# Patient Record
Sex: MALE | Race: WHITE | NOT HISPANIC OR LATINO | Employment: OTHER | URBAN - METROPOLITAN AREA
[De-identification: names, ages, dates, MRNs, and addresses within clinical notes are randomized per-mention and may not be internally consistent; named-entity substitution may affect disease eponyms.]

---

## 2021-11-18 ENCOUNTER — OFFICE VISIT (OUTPATIENT)
Dept: FAMILY MEDICINE | Facility: CLINIC | Age: 36
End: 2021-11-18
Payer: MEDICARE

## 2021-11-18 VITALS
WEIGHT: 199 LBS | TEMPERATURE: 98 F | DIASTOLIC BLOOD PRESSURE: 76 MMHG | SYSTOLIC BLOOD PRESSURE: 134 MMHG | HEART RATE: 80 BPM | HEIGHT: 70 IN | BODY MASS INDEX: 28.49 KG/M2

## 2021-11-18 DIAGNOSIS — D17.21 LIPOMA OF RIGHT UPPER EXTREMITY: ICD-10-CM

## 2021-11-18 DIAGNOSIS — J30.9 ALLERGIC SINUSITIS: ICD-10-CM

## 2021-11-18 DIAGNOSIS — Z79.899 ENCOUNTER FOR LONG-TERM (CURRENT) USE OF OTHER MEDICATIONS: ICD-10-CM

## 2021-11-18 DIAGNOSIS — Z76.89 ESTABLISHING CARE WITH NEW DOCTOR, ENCOUNTER FOR: Primary | ICD-10-CM

## 2021-11-18 DIAGNOSIS — M70.831 OTHER SOFT TISSUE DISORDERS RELATED TO USE, OVERUSE AND PRESSURE, RIGHT FOREARM: ICD-10-CM

## 2021-11-18 PROCEDURE — 99204 PR OFFICE/OUTPT VISIT, NEW, LEVL IV, 45-59 MIN: ICD-10-PCS | Mod: S$GLB,,, | Performed by: PHYSICIAN ASSISTANT

## 2021-11-18 PROCEDURE — 99204 OFFICE O/P NEW MOD 45 MIN: CPT | Mod: S$GLB,,, | Performed by: PHYSICIAN ASSISTANT

## 2021-11-18 RX ORDER — LORATADINE 10 MG/1
10 TABLET ORAL DAILY
Qty: 90 TABLET | Refills: 1 | Status: SHIPPED | OUTPATIENT
Start: 2021-11-18 | End: 2022-11-18

## 2021-11-24 ENCOUNTER — TELEPHONE (OUTPATIENT)
Dept: FAMILY MEDICINE | Facility: CLINIC | Age: 36
End: 2021-11-24
Payer: MEDICARE

## 2021-11-24 LAB
ALBUMIN SERPL-MCNC: 4.5 G/DL (ref 3.6–5.1)
ALBUMIN/GLOB SERPL: 1.8 (CALC) (ref 1–2.5)
ALP SERPL-CCNC: 90 U/L (ref 36–130)
ALT SERPL-CCNC: 30 U/L (ref 9–46)
APPEARANCE UR: CLEAR
AST SERPL-CCNC: 20 U/L (ref 10–40)
BACTERIA #/AREA URNS HPF: NORMAL /HPF
BACTERIA UR CULT: NORMAL
BASOPHILS # BLD AUTO: 20 CELLS/UL (ref 0–200)
BASOPHILS NFR BLD AUTO: 0.4 %
BILIRUB SERPL-MCNC: 0.6 MG/DL (ref 0.2–1.2)
BILIRUB UR QL STRIP: NEGATIVE
BUN SERPL-MCNC: 19 MG/DL (ref 7–25)
BUN/CREAT SERPL: NORMAL (CALC) (ref 6–22)
CALCIUM SERPL-MCNC: 9.7 MG/DL (ref 8.6–10.3)
CHLORIDE SERPL-SCNC: 101 MMOL/L (ref 98–110)
CHOLEST SERPL-MCNC: 194 MG/DL
CHOLEST/HDLC SERPL: 5.4 (CALC)
CO2 SERPL-SCNC: 27 MMOL/L (ref 20–32)
COLOR UR: YELLOW
CREAT SERPL-MCNC: 1.1 MG/DL (ref 0.6–1.35)
EOSINOPHIL # BLD AUTO: 311 CELLS/UL (ref 15–500)
EOSINOPHIL NFR BLD AUTO: 6.1 %
ERYTHROCYTE [DISTWIDTH] IN BLOOD BY AUTOMATED COUNT: 14.5 % (ref 11–15)
GLOBULIN SER CALC-MCNC: 2.5 G/DL (CALC) (ref 1.9–3.7)
GLUCOSE SERPL-MCNC: 85 MG/DL (ref 65–99)
GLUCOSE UR QL STRIP: NEGATIVE
HBA1C MFR BLD: 5.5 % OF TOTAL HGB
HCT VFR BLD AUTO: 47.8 % (ref 38.5–50)
HDLC SERPL-MCNC: 36 MG/DL
HGB BLD-MCNC: 15.2 G/DL (ref 13.2–17.1)
HGB UR QL STRIP: NEGATIVE
HYALINE CASTS #/AREA URNS LPF: NORMAL /LPF
KETONES UR QL STRIP: NEGATIVE
LDLC SERPL CALC-MCNC: 122 MG/DL (CALC)
LEUKOCYTE ESTERASE UR QL STRIP: NEGATIVE
LYMPHOCYTES # BLD AUTO: 1341 CELLS/UL (ref 850–3900)
LYMPHOCYTES NFR BLD AUTO: 26.3 %
MCH RBC QN AUTO: 25.7 PG (ref 27–33)
MCHC RBC AUTO-ENTMCNC: 31.8 G/DL (ref 32–36)
MCV RBC AUTO: 80.7 FL (ref 80–100)
MONOCYTES # BLD AUTO: 342 CELLS/UL (ref 200–950)
MONOCYTES NFR BLD AUTO: 6.7 %
NEUTROPHILS # BLD AUTO: 3086 CELLS/UL (ref 1500–7800)
NEUTROPHILS NFR BLD AUTO: 60.5 %
NITRITE UR QL STRIP: NEGATIVE
NONHDLC SERPL-MCNC: 158 MG/DL (CALC)
PH UR STRIP: 5.5 [PH] (ref 5–8)
PLATELET # BLD AUTO: 361 THOUSAND/UL (ref 140–400)
PMV BLD REES-ECKER: 10.1 FL (ref 7.5–12.5)
POTASSIUM SERPL-SCNC: 4.6 MMOL/L (ref 3.5–5.3)
PROT SERPL-MCNC: 7 G/DL (ref 6.1–8.1)
PROT UR QL STRIP: NEGATIVE
RBC # BLD AUTO: 5.92 MILLION/UL (ref 4.2–5.8)
RBC #/AREA URNS HPF: NORMAL /HPF
SODIUM SERPL-SCNC: 136 MMOL/L (ref 135–146)
SP GR UR STRIP: 1.02 (ref 1–1.03)
SQUAMOUS #/AREA URNS HPF: NORMAL /HPF
TRIGL SERPL-MCNC: 238 MG/DL
TSH SERPL-ACNC: 3.98 MIU/L (ref 0.4–4.5)
WBC # BLD AUTO: 5.1 THOUSAND/UL (ref 3.8–10.8)
WBC #/AREA URNS HPF: NORMAL /HPF

## 2021-12-14 ENCOUNTER — HOSPITAL ENCOUNTER (OUTPATIENT)
Dept: RADIOLOGY | Facility: HOSPITAL | Age: 36
Discharge: HOME | End: 2021-12-14
Attending: PHYSICIAN ASSISTANT
Payer: MEDICARE

## 2021-12-14 DIAGNOSIS — M70.831 OTHER SOFT TISSUE DISORDERS RELATED TO USE, OVERUSE AND PRESSURE, RIGHT FOREARM: ICD-10-CM

## 2021-12-14 DIAGNOSIS — D17.21 LIPOMA OF RIGHT UPPER EXTREMITY: ICD-10-CM

## 2021-12-14 PROCEDURE — 76882 US LMTD JT/FCL EVL NVASC XTR: CPT | Mod: TC,PO,RT

## 2021-12-20 ENCOUNTER — TELEPHONE (OUTPATIENT)
Dept: FAMILY MEDICINE | Facility: CLINIC | Age: 36
End: 2021-12-20
Payer: MEDICARE

## 2021-12-22 ENCOUNTER — TELEPHONE (OUTPATIENT)
Dept: FAMILY MEDICINE | Facility: CLINIC | Age: 36
End: 2021-12-22
Payer: MEDICARE

## 2021-12-22 DIAGNOSIS — R22.31 LOCALIZED SWELLING, MASS, OR LUMP OF RIGHT UPPER EXTREMITY: ICD-10-CM

## 2022-01-03 ENCOUNTER — TELEPHONE (OUTPATIENT)
Dept: FAMILY MEDICINE | Facility: CLINIC | Age: 37
End: 2022-01-03
Payer: MEDICARE

## 2022-01-03 NOTE — TELEPHONE ENCOUNTER
----- Message from Radha Cuellar sent at 1/3/2022 11:53 AM CST -----  Patient father (Fidencio)called and stated that his son is having a MRI on Wednesday and he would like to speak to the nurse about what kind of tracer will be used for the test please give him a call at [phone]

## 2022-01-03 NOTE — TELEPHONE ENCOUNTER
----- Message from Meka Veras sent at 1/3/2022  2:58 PM CST -----  Pt's dad calling to ask if there will be contrast with the MRI that has been ordered and scheduled said the pt is very allergic to Iodine causing Anaphylaxis.  He wants to know what will be used and will there be any iodine agents used.  # [phone]

## 2022-01-05 ENCOUNTER — HOSPITAL ENCOUNTER (OUTPATIENT)
Dept: RADIOLOGY | Facility: HOSPITAL | Age: 37
Discharge: HOME | End: 2022-01-05
Attending: PHYSICIAN ASSISTANT
Payer: MEDICARE

## 2022-01-05 DIAGNOSIS — R22.31 LOCALIZED SWELLING, MASS, OR LUMP OF RIGHT UPPER EXTREMITY: ICD-10-CM

## 2022-01-05 PROCEDURE — 73220 MRI UPPR EXTREMITY W/O&W/DYE: CPT | Mod: TC,RT

## 2022-01-05 PROCEDURE — 25500020 PHARM REV CODE 255: Performed by: PHYSICIAN ASSISTANT

## 2022-01-05 PROCEDURE — A9585 GADOBUTROL INJECTION: HCPCS | Performed by: PHYSICIAN ASSISTANT

## 2022-01-05 RX ORDER — GADOBUTROL 604.72 MG/ML
9 INJECTION INTRAVENOUS
Status: COMPLETED | OUTPATIENT
Start: 2022-01-05 | End: 2022-01-05

## 2022-01-05 RX ADMIN — GADOBUTROL 9 ML: 604.72 INJECTION INTRAVENOUS at 04:01

## 2022-01-12 ENCOUNTER — TELEPHONE (OUTPATIENT)
Dept: FAMILY MEDICINE | Facility: CLINIC | Age: 37
End: 2022-01-12
Payer: MEDICARE

## 2022-01-12 NOTE — TELEPHONE ENCOUNTER
----- Message from KASHIF Christianson sent at 1/10/2022  8:40 AM CST -----  Please contact patient and inform him that his MRI of the right forearm was reviewed.  A benign, lipoma (fatty tissue mass) was noted.  This lesion is benign and we will continue to monitor of follow-up appointments.

## 2024-08-26 ENCOUNTER — HOSPITAL ENCOUNTER (OUTPATIENT)
Facility: HOSPITAL | Age: 39
LOS: 1 days | Discharge: PSYCHIATRIC OR SUBSTANCE USE FACILITY | End: 2024-08-27
Attending: EMERGENCY MEDICINE | Admitting: HOSPITALIST
Payer: MEDICARE

## 2024-08-26 DIAGNOSIS — F29 PSYCHOSIS: ICD-10-CM

## 2024-08-26 DIAGNOSIS — R41.82 ALTERED MENTAL STATUS, UNSPECIFIED ALTERED MENTAL STATUS TYPE: Primary | ICD-10-CM

## 2024-08-26 DIAGNOSIS — R07.9 CHEST PAIN: ICD-10-CM

## 2024-08-26 DIAGNOSIS — R41.0 CONFUSION: ICD-10-CM

## 2024-08-26 DIAGNOSIS — Z00.8 MEDICAL CLEARANCE FOR PSYCHIATRIC ADMISSION: ICD-10-CM

## 2024-08-26 LAB
ALBUMIN SERPL BCP-MCNC: 4.8 G/DL (ref 3.5–5.2)
ALP SERPL-CCNC: 48 U/L (ref 55–135)
ALT SERPL W/O P-5'-P-CCNC: 19 U/L (ref 10–44)
AMMONIA PLAS-SCNC: 26 UMOL/L (ref 10–50)
AMPHET+METHAMPHET UR QL: NEGATIVE
ANION GAP SERPL CALC-SCNC: 12 MMOL/L (ref 8–16)
APAP SERPL-MCNC: 0.3 UG/ML (ref 10–20)
AST SERPL-CCNC: 19 U/L (ref 10–40)
BARBITURATES UR QL SCN>200 NG/ML: NEGATIVE
BASOPHILS # BLD AUTO: 0.01 K/UL (ref 0–0.2)
BASOPHILS NFR BLD: 0.2 % (ref 0–1.9)
BENZODIAZ UR QL SCN>200 NG/ML: NEGATIVE
BILIRUB SERPL-MCNC: 0.5 MG/DL (ref 0.1–1)
BILIRUB UR QL STRIP: NEGATIVE
BUN SERPL-MCNC: 27 MG/DL (ref 6–20)
BZE UR QL SCN: NEGATIVE
CALCIUM SERPL-MCNC: 10 MG/DL (ref 8.7–10.5)
CANNABINOIDS UR QL SCN: ABNORMAL
CHLORIDE SERPL-SCNC: 101 MMOL/L (ref 95–110)
CLARITY UR: CLEAR
CO2 SERPL-SCNC: 25 MMOL/L (ref 23–29)
COLOR UR: YELLOW
CREAT SERPL-MCNC: 1.1 MG/DL (ref 0.5–1.4)
CREAT UR-MCNC: 130.5 MG/DL (ref 23–375)
DIFFERENTIAL METHOD BLD: NORMAL
EOSINOPHIL # BLD AUTO: 0.2 K/UL (ref 0–0.5)
EOSINOPHIL NFR BLD: 3.7 % (ref 0–8)
ERYTHROCYTE [DISTWIDTH] IN BLOOD BY AUTOMATED COUNT: 13 % (ref 11.5–14.5)
EST. GFR  (NO RACE VARIABLE): >60 ML/MIN/1.73 M^2
ETHANOL SERPL-MCNC: <10 MG/DL
GLUCOSE SERPL-MCNC: 103 MG/DL (ref 70–110)
GLUCOSE UR QL STRIP: NEGATIVE
HCT VFR BLD AUTO: 42.5 % (ref 40–54)
HGB BLD-MCNC: 14.1 G/DL (ref 14–18)
HGB UR QL STRIP: NEGATIVE
IMM GRANULOCYTES # BLD AUTO: 0.01 K/UL (ref 0–0.04)
IMM GRANULOCYTES NFR BLD AUTO: 0.2 % (ref 0–0.5)
KETONES UR QL STRIP: ABNORMAL
LEUKOCYTE ESTERASE UR QL STRIP: NEGATIVE
LYMPHOCYTES # BLD AUTO: 1.1 K/UL (ref 1–4.8)
LYMPHOCYTES NFR BLD: 26 % (ref 18–48)
MCH RBC QN AUTO: 27.2 PG (ref 27–31)
MCHC RBC AUTO-ENTMCNC: 33.2 G/DL (ref 32–36)
MCV RBC AUTO: 82 FL (ref 82–98)
MONOCYTES # BLD AUTO: 0.4 K/UL (ref 0.3–1)
MONOCYTES NFR BLD: 9.1 % (ref 4–15)
NEUTROPHILS # BLD AUTO: 2.6 K/UL (ref 1.8–7.7)
NEUTROPHILS NFR BLD: 60.8 % (ref 38–73)
NITRITE UR QL STRIP: NEGATIVE
NRBC BLD-RTO: 0 /100 WBC
OPIATES UR QL SCN: NEGATIVE
PCP UR QL SCN>25 NG/ML: NEGATIVE
PH UR STRIP: 6 [PH] (ref 5–8)
PLATELET # BLD AUTO: 289 K/UL (ref 150–450)
PMV BLD AUTO: 9.9 FL (ref 9.2–12.9)
POTASSIUM SERPL-SCNC: 3.7 MMOL/L (ref 3.5–5.1)
PROT SERPL-MCNC: 7.5 G/DL (ref 6–8.4)
PROT UR QL STRIP: NEGATIVE
RBC # BLD AUTO: 5.18 M/UL (ref 4.6–6.2)
SALICYLATES SERPL-MCNC: <1.5 MG/DL (ref 15–30)
SARS-COV-2 RDRP RESP QL NAA+PROBE: NEGATIVE
SODIUM SERPL-SCNC: 138 MMOL/L (ref 136–145)
SP GR UR STRIP: 1.03 (ref 1–1.03)
T4 FREE SERPL-MCNC: 0.84 NG/DL (ref 0.71–1.51)
TOXICOLOGY INFORMATION: ABNORMAL
TSH SERPL DL<=0.005 MIU/L-ACNC: 7.23 UIU/ML (ref 0.34–5.6)
URN SPEC COLLECT METH UR: ABNORMAL
UROBILINOGEN UR STRIP-ACNC: NEGATIVE EU/DL
WBC # BLD AUTO: 4.27 K/UL (ref 3.9–12.7)

## 2024-08-26 PROCEDURE — G0426 INPT/ED TELECONSULT50: HCPCS | Mod: 95,,, | Performed by: PSYCHIATRY & NEUROLOGY

## 2024-08-26 PROCEDURE — 80053 COMPREHEN METABOLIC PANEL: CPT | Performed by: EMERGENCY MEDICINE

## 2024-08-26 PROCEDURE — 84439 ASSAY OF FREE THYROXINE: CPT | Performed by: EMERGENCY MEDICINE

## 2024-08-26 PROCEDURE — 99285 EMERGENCY DEPT VISIT HI MDM: CPT | Mod: 25

## 2024-08-26 PROCEDURE — 25000003 PHARM REV CODE 250: Performed by: HOSPITALIST

## 2024-08-26 PROCEDURE — 25000003 PHARM REV CODE 250: Performed by: EMERGENCY MEDICINE

## 2024-08-26 PROCEDURE — U0002 COVID-19 LAB TEST NON-CDC: HCPCS | Performed by: EMERGENCY MEDICINE

## 2024-08-26 PROCEDURE — 82077 ASSAY SPEC XCP UR&BREATH IA: CPT | Performed by: EMERGENCY MEDICINE

## 2024-08-26 PROCEDURE — 11000001 HC ACUTE MED/SURG PRIVATE ROOM

## 2024-08-26 PROCEDURE — 81003 URINALYSIS AUTO W/O SCOPE: CPT | Mod: 59 | Performed by: EMERGENCY MEDICINE

## 2024-08-26 PROCEDURE — 80307 DRUG TEST PRSMV CHEM ANLYZR: CPT | Performed by: EMERGENCY MEDICINE

## 2024-08-26 PROCEDURE — 82140 ASSAY OF AMMONIA: CPT | Performed by: EMERGENCY MEDICINE

## 2024-08-26 PROCEDURE — 80143 DRUG ASSAY ACETAMINOPHEN: CPT | Performed by: EMERGENCY MEDICINE

## 2024-08-26 PROCEDURE — 85025 COMPLETE CBC W/AUTO DIFF WBC: CPT | Performed by: EMERGENCY MEDICINE

## 2024-08-26 PROCEDURE — 84443 ASSAY THYROID STIM HORMONE: CPT | Performed by: EMERGENCY MEDICINE

## 2024-08-26 PROCEDURE — 80179 DRUG ASSAY SALICYLATE: CPT | Performed by: EMERGENCY MEDICINE

## 2024-08-26 RX ORDER — GLUCAGON 1 MG
1 KIT INJECTION
Status: DISCONTINUED | OUTPATIENT
Start: 2024-08-26 | End: 2024-08-27 | Stop reason: HOSPADM

## 2024-08-26 RX ORDER — TALC
6 POWDER (GRAM) TOPICAL NIGHTLY PRN
Status: CANCELLED | OUTPATIENT
Start: 2024-08-26

## 2024-08-26 RX ORDER — SODIUM,POTASSIUM PHOSPHATES 280-250MG
2 POWDER IN PACKET (EA) ORAL
Status: DISCONTINUED | OUTPATIENT
Start: 2024-08-26 | End: 2024-08-27 | Stop reason: HOSPADM

## 2024-08-26 RX ORDER — ALUMINUM HYDROXIDE, MAGNESIUM HYDROXIDE, AND SIMETHICONE 1200; 120; 1200 MG/30ML; MG/30ML; MG/30ML
30 SUSPENSION ORAL 4 TIMES DAILY PRN
Status: DISCONTINUED | OUTPATIENT
Start: 2024-08-26 | End: 2024-08-27 | Stop reason: HOSPADM

## 2024-08-26 RX ORDER — IBUPROFEN 200 MG
24 TABLET ORAL
Status: DISCONTINUED | OUTPATIENT
Start: 2024-08-26 | End: 2024-08-27 | Stop reason: HOSPADM

## 2024-08-26 RX ORDER — LANOLIN ALCOHOL/MO/W.PET/CERES
800 CREAM (GRAM) TOPICAL
Status: DISCONTINUED | OUTPATIENT
Start: 2024-08-26 | End: 2024-08-27 | Stop reason: HOSPADM

## 2024-08-26 RX ORDER — DIPHENHYDRAMINE HCL 25 MG
50 CAPSULE ORAL
Status: COMPLETED | OUTPATIENT
Start: 2024-08-26 | End: 2024-08-26

## 2024-08-26 RX ORDER — ACETAMINOPHEN 325 MG/1
650 TABLET ORAL EVERY 4 HOURS PRN
Status: DISCONTINUED | OUTPATIENT
Start: 2024-08-26 | End: 2024-08-27 | Stop reason: HOSPADM

## 2024-08-26 RX ORDER — SODIUM CHLORIDE 0.9 % (FLUSH) 0.9 %
10 SYRINGE (ML) INJECTION EVERY 12 HOURS PRN
Status: DISCONTINUED | OUTPATIENT
Start: 2024-08-26 | End: 2024-08-27 | Stop reason: HOSPADM

## 2024-08-26 RX ORDER — AMOXICILLIN 250 MG
1 CAPSULE ORAL
Status: CANCELLED | OUTPATIENT
Start: 2024-08-26

## 2024-08-26 RX ORDER — IBUPROFEN 200 MG
16 TABLET ORAL
Status: DISCONTINUED | OUTPATIENT
Start: 2024-08-26 | End: 2024-08-27 | Stop reason: HOSPADM

## 2024-08-26 RX ORDER — ONDANSETRON HYDROCHLORIDE 2 MG/ML
4 INJECTION, SOLUTION INTRAVENOUS EVERY 6 HOURS PRN
Status: CANCELLED | OUTPATIENT
Start: 2024-08-26

## 2024-08-26 RX ORDER — NALOXONE HCL 0.4 MG/ML
0.02 VIAL (ML) INJECTION
Status: DISCONTINUED | OUTPATIENT
Start: 2024-08-26 | End: 2024-08-27 | Stop reason: HOSPADM

## 2024-08-26 RX ORDER — TRAZODONE HYDROCHLORIDE 50 MG/1
50 TABLET ORAL
Status: COMPLETED | OUTPATIENT
Start: 2024-08-26 | End: 2024-08-26

## 2024-08-26 RX ORDER — TRAZODONE HYDROCHLORIDE 50 MG/1
100 TABLET ORAL NIGHTLY
Status: DISCONTINUED | OUTPATIENT
Start: 2024-08-26 | End: 2024-08-27 | Stop reason: HOSPADM

## 2024-08-26 RX ORDER — HYDROCODONE BITARTRATE AND ACETAMINOPHEN 5; 325 MG/1; MG/1
1 TABLET ORAL EVERY 6 HOURS PRN
Status: DISCONTINUED | OUTPATIENT
Start: 2024-08-26 | End: 2024-08-27 | Stop reason: HOSPADM

## 2024-08-26 RX ORDER — ACETAMINOPHEN 325 MG/1
650 TABLET ORAL EVERY 8 HOURS PRN
Status: DISCONTINUED | OUTPATIENT
Start: 2024-08-26 | End: 2024-08-27 | Stop reason: HOSPADM

## 2024-08-26 RX ADMIN — TRAZODONE HYDROCHLORIDE 100 MG: 50 TABLET ORAL at 11:08

## 2024-08-26 RX ADMIN — DIPHENHYDRAMINE HYDROCHLORIDE 50 MG: 25 CAPSULE ORAL at 10:08

## 2024-08-26 RX ADMIN — TRAZODONE HYDROCHLORIDE 50 MG: 50 TABLET ORAL at 11:08

## 2024-08-26 NOTE — Clinical Note
Diagnosis: Altered mental status, unspecified altered mental status type [[id number]]   Future Attending Provider: SUSAN BYNUM [[id number]]   Place in Observation: [facility] [[id number]]

## 2024-08-27 VITALS
SYSTOLIC BLOOD PRESSURE: 138 MMHG | HEART RATE: 73 BPM | WEIGHT: 150 LBS | HEIGHT: 70 IN | TEMPERATURE: 98 F | RESPIRATION RATE: 20 BRPM | DIASTOLIC BLOOD PRESSURE: 86 MMHG | OXYGEN SATURATION: 97 % | BODY MASS INDEX: 21.47 KG/M2

## 2024-08-27 PROBLEM — F24 SHARED PSYCHOTIC DISORDER: Status: ACTIVE | Noted: 2024-08-27

## 2024-08-27 LAB
FOLATE SERPL-MCNC: 9.3 NG/ML (ref 4–24)
HIV1+2 IGG SERPL QL IA.RAPID: NEGATIVE
OHS QRS DURATION: 138 MS
OHS QTC CALCULATION: 443 MS
VIT B12 SERPL-MCNC: 384 PG/ML (ref 210–950)

## 2024-08-27 PROCEDURE — 82607 VITAMIN B-12: CPT | Performed by: HOSPITALIST

## 2024-08-27 PROCEDURE — 86703 HIV-1/HIV-2 1 RESULT ANTBDY: CPT | Performed by: HOSPITALIST

## 2024-08-27 PROCEDURE — G0378 HOSPITAL OBSERVATION PER HR: HCPCS

## 2024-08-27 PROCEDURE — 86592 SYPHILIS TEST NON-TREP QUAL: CPT | Performed by: HOSPITALIST

## 2024-08-27 PROCEDURE — 82746 ASSAY OF FOLIC ACID SERUM: CPT | Performed by: HOSPITALIST

## 2024-08-27 NOTE — ED NOTES
Pt laying comfortably in bed with Sister at bedside. Bed in lowest and locked position with one rail up due to pt preferring to sit on side of bed at times. Vitals machine at bedside due to complaint of consumption of THC gummies. There are no other moveable objects in the room and environment remains safe. Sitter remains in full view of the pt at all times. Pt and sister denies any needs at this time.    conducted a detailed discussion... I had a detailed discussion with the patient and/or guardian regarding the historical points, exam findings, and any diagnostic results supporting the discharge/admit diagnosis.

## 2024-08-27 NOTE — ED NOTES
Pt is seated on the end of the bed watching tv and is obviously restless. Bed in lowest and locked position with bed rails up x2. Pt denies any needs at this time. The is only a vital machine at bedside and no other moveable objects, environment remains safe. Sitter in full view of pt at all times.

## 2024-08-27 NOTE — ED NOTES
Pt seated on the side of the bed in no obvious distress with a chest rise and fall observed. Sister at bedside. Bed in lowest and locked position with one rail up due to pt preferring to sit on side of bed. Vitals machine at bedside due to complaint of consumption of THC gummies. There are no other moveable objects in the room and environment remains safe. Sitter remains in full view of the pt at all times. Pt and sister denies any needs at this time.

## 2024-08-27 NOTE — CONSULTS
"[facility]  Department of Neurology  Neurology Consultation Note        PATIENT NAME: Fidencio Torres  MRN: [id number]  CSN: [id number]      TODAY'S DATE: 08/27/2024  ADMIT DATE: 8/26/2024                            CONSULTING PROVIDER: Maykel Yanez MD  CONSULT REQUESTED BY: Yaa Jenkins MD      Reason for consult:  Altered mental status      History obtained from chart review and father at bedside.    HPI: Fidencio Torres is a 39 y.o. male with a history of testicular cancer in remission, mild cognitive impairment since birth, p/w acute behavioral changes with onset at around 1 p.m. on 8/26/24. Patient's father provides the history. He reports that patient had otherwise been in his usual state of health until he began unusually perseverating.  Patient apparently took marijuana gummies on Friday and has slept through Saturday and Sunday.  He woke up on Monday morning at about 6 p.m. and was in his normal health until 1 p.m. when he started acting abnormal and repeating himself.  He was perseverating and the foci of his perseverations shifted from topic to topic, but he remained abnormally focused and unable to be redirected or regain a semblance of what a typical day would be. His father described this as being "confused".     His father reports that he spends a considerable amount of time alone, playing video games, or out of state with a female partner. Patient had recently returned to [place] to have "his teeth cleaned", as he has particularly poor dentition/self-care. The patient went to a gas station w/ his father and purchased Delta 9 gummies (although, per the , patient attempted to purchase something that was allegedly "illegal"), and his father reports that patient typically cuts these in half and takes 1/2 gummy per night as a sleep aid. Patient had been taking them in that manner, until 8/23/24 when he presumably took all of the final 4 gummies in the package. Per father, the " "remaining 4 disappeared. Patient does not deny that he consumed all of them. His father does deny that he had any other ingestants, and his last "vaping" was weeks PTA.     Patient reportedly had some kind of behavioral hospitalization at age 7, has what his family identifies as "oppositional defiant disorder", and then had testicular cancer treated approx 20 years.    On my assessment, he was awake and oriented to self/person and place and knew this was August 2024(with some clues).  He was able to follow commands appropriately.  Intermittently was perseverating and asking what time it was to his father.  His father states that this is much better than what he was yesterday however is not completely back to his baseline.      Psychiatry has evaluated him in the ER and recommended inpatient psych admission for possible acute psychosis in the setting of use of THC gummies.    Patient denies any headache, vision trouble, nausea, vomiting, unilateral weakness or sensory changes.    PREVIOUS MEDICAL HISTORY:  Past Medical History:   Diagnosis Date    Cancer 2009    Testicular      PREVIOUS SURGICAL HISTORY:  Past Surgical History:   Procedure Laterality Date    testical removal Right 2009     FAMILY MEDICAL HISTORY:  Family History   Problem Relation Name Age of Onset    No Known Problems Mother      No Known Problems Father      No Known Problems Sister       ALLERGIES:  Review of patient's allergies indicates:   Allergen Reactions    Iodine and iodide containing products Anaphylaxis     HOME MEDICATIONS:  Prior to Admission medications    Not on File     CURRENT SCHEDULED MEDICATIONS:   trazodone  100 mg Oral QHS     CURRENT INFUSIONS:    CURRENT PRN MEDICATIONS:    Current Facility-Administered Medications:     acetaminophen, 650 mg, Oral, Q8H PRN    acetaminophen, 650 mg, Oral, Q4H PRN    aluminum-magnesium hydroxide-simethicone, 30 mL, Oral, QID PRN    dextrose 50%, 12.5 g, Intravenous, PRN    dextrose 50%, 25 g, " "Intravenous, PRN    glucagon (human recombinant), 1 mg, Intramuscular, PRN    glucose, 16 g, Oral, PRN    glucose, 24 g, Oral, PRN    HYDROcodone-acetaminophen, 1 tablet, Oral, Q6H PRN    magnesium oxide, 800 mg, Oral, PRN    magnesium oxide, 800 mg, Oral, PRN    naloxone, 0.02 mg, Intravenous, PRN    potassium bicarbonate, 35 mEq, Oral, PRN    potassium bicarbonate, 50 mEq, Oral, PRN    potassium bicarbonate, 60 mEq, Oral, PRN    potassium, sodium phosphates, 2 packet, Oral, PRN    potassium, sodium phosphates, 2 packet, Oral, PRN    potassium, sodium phosphates, 2 packet, Oral, PRN    sodium chloride 0.9%, 10 mL, Intravenous, Q12H PRN    REVIEW OF SYSTEMS:  Please refer to the HPI for all pertinent positive and negative findings. A comprehensive review of all other systems was negative.    PHYSICAL EXAM:  Patient Vitals for the past 24 hrs:   BP Temp Temp src Pulse Resp SpO2 Height Weight   08/26/24 1827 122/64 98.2 °F (36.8 °C) Oral 70 16 96 % 5' 10" (1.778 m) 68 kg (150 lb)       GENERAL APPEARANCE: Alert, well-developed, well-nourished male in mild distress.  HEENT: Normocephalic and atraumatic. PERRL. Oropharynx unremarkable.  PULM: Normal respiratory effort. No accessory muscle use.  CV: RRR.  ABDOMEN: Soft, nontender.  EXTREMITIES: No obvious signs of vascular compromise. Pulses present. No cyanosis, clubbing or edema.  SKIN: Clear; no rashes, lesions or skin breaks in exposed areas.    NEURO:  MENTAL STATUS:  Patient is awake, oriented to self/person and place.  He was able to tell me the month and year with some clues  CRANIAL NERVES:  Pupils equal round and reactive to light, extraocular movements are intact, face is grossly symmetric.    MOTOR:  Bulk normal. Tone normal and symmetric throughout.  Abnormal movements absent.  Tremor: none present.  Strength 5/5 throughout.    REFLEXES:  DTRs 2+ throughout.  Plantar response equivocal bilaterally.  SENSATION: grossly intact throughout.  COORDINATION: normal " "finger-to-nose.  STATION: not tested.  GAIT: not tested.    Labs:  Recent Labs   Lab 08/26/24 1946      K 3.7      CO2 25   BUN 27*   CREATININE 1.1      CALCIUM 10.0     Recent Labs   Lab 08/26/24 1946   WBC 4.27   HGB 14.1   HCT 42.5        Recent Labs   Lab 08/26/24 1946   ALBUMIN 4.8   PROT 7.5   BILITOT 0.5   ALKPHOS 48*   ALT 19   AST 19     No results found for: "PT", "PTT", "INR"  Lab Results   Component Value Date    TRIG 238 (H) 11/23/2021    HDL 36 (L) 11/23/2021    CHOLHDL 5.4 (H) 11/23/2021     Lab Results   Component Value Date    HGBA1C 5.5 11/23/2021     No results found for: "PROTEINCSF", "GLUCCSF", "WBCCSF"    Imaging:  I have reviewed and interpreted the pertinent imaging and lab results.      X-Ray Chest AP Portable  Narrative: EXAMINATION:  XR CHEST AP PORTABLE    CLINICAL HISTORY:  Disorientation, unspecified    TECHNIQUE:  Single frontal view of the chest was performed.    COMPARISON:  None    FINDINGS:  Cardiac silhouette appears within normal limits.  Lungs are symmetrically expanded without evidence of confluent airspace consolidation.  No significant volume of pleural fluid or pneumothorax identified.  There is a mild rightward curvature of the thoracic spine.  Osseous structures appear intact.  Scattered surgical clips project over the upper abdomen.  Impression: No radiographic evidence of acute intrathoracic process on this single view.    Electronically signed by: Caden Burton MD  Date:    08/26/2024  Time:    19:55  CT Head Without Contrast  Narrative: EXAMINATION:  CT HEAD WITHOUT CONTRAST    CLINICAL HISTORY:  Mental status change, unknown cause;    TECHNIQUE:  Low dose axial images were obtained through the head.  Coronal and sagittal reformations were also performed. Contrast was not administered.    COMPARISON:  None.    FINDINGS:  There is no acute intracranial hemorrhage, hydrocephalus, midline shift or mass effect. Gray-white matter " differentiation appears maintained. The basal cisterns are patent. There is mild mucosal thickening of the frontal and ethmoid sinuses.  The mastoid air cells appear well aerated.  No acute displaced calvarial fracture identified.  Impression: No CT evidence of acute intracranial abnormality. Clinical correlation and further evaluation as warranted.    Electronically signed by: Caden Burton MD  Date:    08/26/2024  Time:    19:41         ASSESSMENT & PLAN:      Encephalopathy   Acute psychosis      Plan:   Etiology of encephalopathy/acute psychosis could likely be in the setting of THC gummies.  Exam was nonfocal and patient was able to follow commands appropriately and answer to most questions.  Concern for acute intracranial pathology is low  however can not completely rule out at this time.  CT head was negative for acute pathology.  If mental status continues to improve, can hold off on further testing.  If no significant improvement in mental status is seen, will need to consider MRI brain with and without contrast and lumbar puncture to rule out autoimmune encephalopathy.    Psychiatry following.  Recommended inpatient psych admission.    Medical management for acute psychosis per Psychiatry.    Avoid benzodiazepines and anticholinergics.    Please call with any questions        Thank you kindly for including us in the care of this patient. Please do not hesitate to contact us with any questions.           Maykel Yanez MD  Neurology/vascular Neurology  Date of Service: 08/27/2024  9:52 AM    --------------------------------------------------------------------------------------------------------------------------------------------------------------------------------------------------------------------------------------------------------------  Please note: This note was transcribed using voice recognition software. Because of this technology there are often uinintended grammatical, spelling, and other  transcription errors. Please disregard these errors.

## 2024-08-27 NOTE — ED NOTES
SPD in unit for transport. Security & pt's dad present. Contact information for [facility] given to dad. Pt given paper scrubs for transport. Pt is awake, answers questions appropriately. Skin w/dr/pk. Rr even/unlab. Calm, cooperative.

## 2024-08-27 NOTE — PLAN OF CARE
PATTERSON explained to patient and father - pt's father v/u and signed form. PATTERSON uploaded into media manager.    08/27/24 1204   PATTERSON Message   Medicare Outpatient and Observation Notification regarding financial responsibility Given to patient/caregiver;Explained to patient/caregiver;Signed/date by patient/caregiver   Date PATTERSON was signed 08/27/24   Time PATTERSON was signed [id number]

## 2024-08-27 NOTE — ED PROVIDER NOTES
Encounter Date: 8/26/2024       History     Chief Complaint   Patient presents with    Mental Health Problem     Pt brought in by family for eval d/t pt being altered. He took unknown amount of delta 9 gummies. he also stated to family he was suicidal PTA, but denies now.     39-year-old male with a history of testicular cancer, oppositional defiant disorder, ADHD presents to the emergency room for abrupt onset of perseveration and confusion that started around noon today.  Patient took 4 delta gummies last night but none today.  He vapes but does not smoke or do any drugs or drink alcohol.  Family states that he was perseverating today for hours repeating that he wanted to go to the spot.  This stopped and then he began to perseverate using his sister's phone.  She would give it to him then he would ask for back and so the pattern would repeat.  This is very abnormal for the patient.  They state typically he is able to do activities of basic living but he does have a developmental delay and possibly is on the autism spectrum according to the sister who is a psychiatric nurse.  Patient denies any complaints.    Sister also reports patient has made multiple suicidal statements lately and has been depressed.    The history is provided by the patient and a relative. The history is limited by the condition of the patient.     Review of patient's allergies indicates:   Allergen Reactions    Iodine and iodide containing products Anaphylaxis     Past Medical History:   Diagnosis Date    Cancer 2009    Testicular      Past Surgical History:   Procedure Laterality Date    testical removal Right 2009     Family History   Problem Relation Name Age of Onset    No Known Problems Mother      No Known Problems Father      No Known Problems Sister       Social History     Tobacco Use    Smoking status: Never    Smokeless tobacco: Never   Substance Use Topics    Alcohol use: Not Currently     Comment: 1 beer on holidays    Drug use:  Not Currently     Types: Marijuana     Review of Systems   Unable to perform ROS: Mental status change       Physical Exam     Initial Vitals [08/26/24 1827]   BP Pulse Resp Temp SpO2   122/64 70 16 98.2 °F (36.8 °C) 96 %      MAP       --         Physical Exam    Nursing note and vitals reviewed.  Constitutional: He appears well-developed and well-nourished. He is not diaphoretic.  Non-toxic appearance. He does not have a sickly appearance. He does not appear ill. No distress.   HENT:   Head: Normocephalic and atraumatic.   Eyes: EOM are normal.   Neck: Neck supple.   Normal range of motion.  Cardiovascular:  Normal rate, regular rhythm and normal heart sounds.     Exam reveals no gallop and no friction rub.       No murmur heard.  Pulmonary/Chest: Breath sounds normal. No respiratory distress. He has no wheezes. He has no rhonchi. He has no rales.   Abdominal: Abdomen is soft. He exhibits no distension. There is no abdominal tenderness. There is no rebound.   Musculoskeletal:         General: Normal range of motion.      Cervical back: Normal range of motion and neck supple. No rigidity. Normal range of motion.     Neurological: He is alert.   Thinks that the year is 1983, able to follow basic commands.  Oriented to the state but not the exact location.  According to the family this is abnormal for him not to be oriented to the year and location.   Skin: Skin is warm and dry. No rash noted.   Psychiatric: Cognition and memory are impaired. He exhibits abnormal recent memory and abnormal remote memory. He is inattentive.         ED Course   Procedures  Labs Reviewed   COMPREHENSIVE METABOLIC PANEL - Abnormal       Result Value    Sodium 138      Potassium 3.7      Chloride 101      CO2 25      Glucose 103      BUN 27 (*)     Creatinine 1.1      Calcium 10.0      Total Protein 7.5      Albumin 4.8      Total Bilirubin 0.5      Alkaline Phosphatase 48 (*)     AST 19      ALT 19      eGFR >60.0      Anion Gap 12      TSH - Abnormal    TSH 7.226 (*)    URINALYSIS, REFLEX TO URINE CULTURE - Abnormal    Specimen UA Urine, Clean Catch      Color, UA Yellow      Appearance, UA Clear      pH, UA 6.0      Specific Gravity, UA 1.030      Protein, UA Negative      Glucose, UA Negative      Ketones, UA Trace (*)     Bilirubin (UA) Negative      Occult Blood UA Negative      Nitrite, UA Negative      Urobilinogen, UA Negative      Leukocytes, UA Negative      Narrative:     Specimen Source->Urine   DRUG SCREEN PANEL, URINE EMERGENCY - Abnormal    Benzodiazepines Negative      Cocaine (Metab.) Negative      Opiate Scrn, Ur Negative      Barbiturate Screen, Ur Negative      Amphetamine Screen, Ur Negative      THC Presumptive Positive (*)     Phencyclidine Negative      Creatinine, Urine 130.5      Toxicology Information SEE COMMENT      Narrative:     Specimen Source->Urine   ACETAMINOPHEN LEVEL - Abnormal    Acetaminophen (Tylenol), Serum 0.3 (*)    SALICYLATE LEVEL - Abnormal    Salicylate Lvl <1.5 (*)    CBC W/ AUTO DIFFERENTIAL    WBC 4.27      RBC 5.18      Hemoglobin 14.1      Hematocrit 42.5      MCV 82      MCH 27.2      MCHC 33.2      RDW 13.0      Platelets 289      MPV 9.9      Immature Granulocytes 0.2      Gran # (ANC) 2.6      Immature Grans (Abs) 0.01      Lymph # 1.1      Mono # 0.4      Eos # 0.2      Baso # 0.01      nRBC 0      Gran % 60.8      Lymph % 26.0      Mono % 9.1      Eosinophil % 3.7      Basophil % 0.2      Differential Method Automated     ALCOHOL,MEDICAL (ETHANOL)    Alcohol, Serum <10     SARS-COV-2 RNA AMPLIFICATION, QUAL    SARS-CoV-2 RNA, Amplification, Qual Negative     AMMONIA    Ammonia 26     T4, FREE   POCT GLUCOSE MONITORING CONTINUOUS          Imaging Results              X-Ray Chest AP Portable (Final result)  Result time 08/26/24 19:55:51      Final result by Caden Burton MD (08/26/24 19:55:51)                   Impression:      No radiographic evidence of acute intrathoracic process on  this single view.      Electronically signed by: Caden Burton MD  Date:    08/26/2024  Time:    19:55               Narrative:    EXAMINATION:  XR CHEST AP PORTABLE    CLINICAL HISTORY:  Disorientation, unspecified    TECHNIQUE:  Single frontal view of the chest was performed.    COMPARISON:  None    FINDINGS:  Cardiac silhouette appears within normal limits.  Lungs are symmetrically expanded without evidence of confluent airspace consolidation.  No significant volume of pleural fluid or pneumothorax identified.  There is a mild rightward curvature of the thoracic spine.  Osseous structures appear intact.  Scattered surgical clips project over the upper abdomen.                                       CT Head Without Contrast (Final result)  Result time 08/26/24 19:41:05      Final result by Caden Burton MD (08/26/24 19:41:05)                   Impression:      No CT evidence of acute intracranial abnormality. Clinical correlation and further evaluation as warranted.      Electronically signed by: Caden Burton MD  Date:    08/26/2024  Time:    19:41               Narrative:    EXAMINATION:  CT HEAD WITHOUT CONTRAST    CLINICAL HISTORY:  Mental status change, unknown cause;    TECHNIQUE:  Low dose axial images were obtained through the head.  Coronal and sagittal reformations were also performed. Contrast was not administered.    COMPARISON:  None.    FINDINGS:  There is no acute intracranial hemorrhage, hydrocephalus, midline shift or mass effect. Gray-white matter differentiation appears maintained. The basal cisterns are patent. There is mild mucosal thickening of the frontal and ethmoid sinuses.  The mastoid air cells appear well aerated.  No acute displaced calvarial fracture identified.                                       Medications - No data to display  Medical Decision Making  Amount and/or Complexity of Data Reviewed  Labs: ordered. Decision-making details documented in ED Course.  Radiology:  ordered. Decision-making details documented in ED Course.  Discussion of management or test interpretation with external provider(s): Spoke with Tele psych MD - who feels pt has a substance abuse induced psychosis.  But he was not stable for discharge home recommends patient was going to psychiatric facility if symptoms do not improve I discussed with them that [facility] I will pass this information on to hospitalist.   Darlene Borrego M.D.  11:17 PM 8/26/2024      Risk  OTC drugs.  Decision regarding hospitalization.               ED Course as of 08/26/24 2130   Mon Aug 26, 2024   1853 SpO2: 96 % [EF]   1853 Resp: 16 [EF]   1853 Pulse: 70 [EF]   1853 Temp Source: Oral [EF]   1853 BP: 122/64 [EF]   2003 CT Head Without Contrast [EF]   2003 X-Ray Chest AP Portable [EF]   2007 Sinus rhythm with sinus arrhythmia 69 beats per minute normal axis no ST elevation or depression nonspecific intraventricular block independently interpreted [EF]   2016 WBC: 4.27 [EF]   2016 Hemoglobin: 14.1 [EF]   2016 Platelet Count: 289 [EF]   2016 Leukocyte Esterase, UA: Negative [EF]   2016 NITRITE UA: Negative [EF]   2059 Patient care transferred to Dr Borrego at shift change. I discussed the relevant history, physical exam, laboratory findings, radiological findings and anticipated disposition plan. On coming staff to formally complete visit disposition, review any pending laboratory/radiological results and to addend treatment plan as necessary.     [EF]   2059 Normal CBC and CMP  Ammonia level 26  Urine with trace ketones otherwise normal  Tox negative  Tylenol less than 0.3  CMP with BUN 27  COVID negative [RM]   2100 I took over care of this pt at the end of Dr Haines shift  [RM]   2123 TSH 7.226  Alcohol level less than 10  Salicylates less than 1.5 [RM]      ED Course User Index  [EF] Jamari Haines MD  [RM] Darlene Borrego MD          All labs have returned other than  BUN being elevated at 27 and patient can tolerate oral intake and TSH being elevated with a T4 currently pending no other labs explain the patient's altered mental status.  Pec has been completed by previous physician.  Patient was now be admitted to the hospital for further evaluation of altered mental status I will call and let his sister Lizeth know who he was just left the hospital her phone #[phone] work [phone]  Darlene Borrego M.D.  9:28 PM 8/26/2024                   Clinical Impression:  Final diagnoses:  [Z00.8] Medical clearance for psychiatric admission  [R41.0] Confusion  [R41.82] Altered mental status, unspecified altered mental status type (Primary)          ED Disposition Condition    Admit Stable                Darlene Borrego MD  08/26/24 0212       Darlene Borrego MD  08/26/24 8731

## 2024-08-27 NOTE — SUBJECTIVE & OBJECTIVE
Past Medical History:   Diagnosis Date    Cancer 2009    Testicular        Past Surgical History:   Procedure Laterality Date    testical removal Right 2009       Review of patient's allergies indicates:   Allergen Reactions    Iodine and iodide containing products Anaphylaxis       No current facility-administered medications on file prior to encounter.     No current outpatient medications on file prior to encounter.     Family History       Problem Relation (Age of Onset)    No Known Problems Mother, Father, Sister          Tobacco Use    Smoking status: Never    Smokeless tobacco: Never   Substance and Sexual Activity    Alcohol use: Not Currently     Comment: 1 beer on holidays    Drug use: Not Currently     Types: Marijuana    Sexual activity: Yes     Partners: Female     Birth control/protection: None     Review of Systems   Constitutional:  Positive for activity change. Negative for appetite change, chills and fever.   Psychiatric/Behavioral:  Positive for behavioral problems, confusion, sleep disturbance and suicidal ideas. The patient is hyperactive.      Objective:     Vital Signs (Most Recent):  Temp: 98.2 °F (36.8 °C) (08/26/24 1827)  Pulse: 70 (08/26/24 1827)  Resp: 16 (08/26/24 1827)  BP: 122/64 (08/26/24 1827)  SpO2: 96 % (08/26/24 1827) Vital Signs (24h Range):  Temp:  [98.2 °F (36.8 °C)] 98.2 °F (36.8 °C)  Pulse:  [70] 70  Resp:  [16] 16  SpO2:  [96 %] 96 %  BP: (122)/(64) 122/64     Weight: 68 kg (150 lb)  Body mass index is 21.52 kg/m².     Physical Exam  Vitals and nursing note reviewed.   Constitutional:       General: He is not in acute distress.     Appearance: He is ill-appearing.      Comments: Disheveled, unshaven   Eyes:      Extraocular Movements: Extraocular movements intact.      Pupils: Pupils are equal, round, and reactive to light.   Cardiovascular:      Rate and Rhythm: Normal rate and regular rhythm.      Heart sounds: No murmur heard.  Pulmonary:      Effort: Pulmonary effort is  normal. No respiratory distress.      Breath sounds: Normal breath sounds. No wheezing or rhonchi.   Musculoskeletal:      Cervical back: Normal range of motion and neck supple. No rigidity.   Neurological:      Mental Status: He is alert.      Cranial Nerves: No cranial nerve deficit (intact and tested).      Comments: AAOx1, but patient was unable to remain on topic long enough to answer questions about location or name (he kept repeating his birthdate when asked for the month, year, name, or location, although he eventually was able to state the correct month/year)   Psychiatric:      Comments: Clearly having psychosis w/ perseverating behavior, unable to redirect fully; no shouting or agitation; poor eye contact; not responding to internal stimuli              CRANIAL NERVES     CN III, IV, VI   Pupils are equal, round, and reactive to light.       Significant Labs: All pertinent labs within the past 24 hours have been reviewed.  BMP:   Recent Labs   Lab 08/26/24 1946         K 3.7      CO2 25   BUN 27*   CREATININE 1.1   CALCIUM 10.0     CBC:   Recent Labs   Lab 08/26/24 1946   WBC 4.27   HGB 14.1   HCT 42.5          Significant Imaging: I have reviewed all pertinent imaging results/findings within the past 24 hours.  CXR: I have reviewed all pertinent results/findings within the past 24 hours and my personal findings are:  clear

## 2024-08-27 NOTE — HPI
"39M p/w acute behavioral changes with onset at 12:48p on 8/26/24. Patient's father provides the hx. He reports that patient had otherwise been in his usual state of health until he began unusually perseverating. The foci of his perseverations shifted from topic to topic, but he remained abnormally focused and unable to be redirected or regain a semblance of what a typical day would be. His father described this as being "confused", although the patient demonstrated such perseveration on minute subjects/tasks/objects that it was difficult to engage him when I came into evaluate him. He was not agitated, or anxious appearing, but rather hypervigilant and perseverating.     His father reports that he spends a considerable amount of time alone, playing video games, or out of state with a female partner. Patient had recently returned to [place] to have "his teeth cleaned", as he has particularly poor dentition/self-care. The patient went to a [employer] station w/ his father and purchased Delta 9 gummies (although, per the , patient attempted to purchase something that was allegedly "illegal"), and his father reports that patient typically cuts these in half and takes 1/2 gummy per night as a sleep aid. Patient had been taking them in that manner, until 8/23/24 when he presumably took all of the final 4 gummies in the package. Per father, the remaining 4 disappeared. Patient does not deny that he consumed all of them. His father does deny that he had any other ingestants, and his last "vaping" was weeks PTA.    Patient reportedly had some kind of behavioral hospitalization at age 7, has what his family identifies as "oppositional defiant disorder", and then had testicular cancer treated approx 20 years.  "

## 2024-08-27 NOTE — ED NOTES
Pt sitting at the end of the bed with father at bedside. Pt is beginning  to become restless. Pt gets out of bed to pace from door to door then he sits back in bed. Pt denied any needs at this time. Pt bed in lowest and locked position with rails up x2. Environment remains safe and the only moveable object in the room is the vital machine which is deemed medically necessary  to the have in the room. Sitter in full view of the pt at all times.

## 2024-08-27 NOTE — PATIENT INSTRUCTIONS
Thank you for allowing me to participate as part of your health care team, and thank you for choosing [facility].    KIRSTEN OLIVERA MD  Board Certified in Psychiatry & Addiction Medicine      IN CASE OF SUICIDAL THINKING, call the National Suicide Hotline Number: 988    988 Suicide & Crisis Lifeline: 988 , 9-411-541-TALK (8255)  https://[employer].Identified           AFTER VISIT INSTRUCTIONS:     [x] Take all medication, from all providers, as prescribed.  [x] If questions or concerns arise, or if experiencing side effects, adverse reactions or worsening symptoms, contact your provider through the [facility] portal at [email or web address], or call [phone] to reach the [facility] main line.  [x] In cases of emergencies, call 221 or 204, or present directly to the emergency department for immediate assistance.      INFORMATION ON MENTAL HEALTH MEDICATIONS:     National [place] of Mental Health:   https://www.nimh.nih.gov/health/topics/mental-health-medications     Web MD:   https://www.[facility].MyGoodPoints       RESOURCES:     IN CASE OF SUICIDAL THINKING, call the [employer] Suicide Hotline Number: 988    988 Suicide & Crisis Lifeline: 988 , 7-076-277-TALK (8255)  Provides 24/7, free and confidential support for people in distress, prevention and crisis resources for you or your loved ones, and best practices for professionals.    Call, text or chat.  https://[employer].org     National Action Leadore for Suicide Prevention: the National Action Leadore for Suicide Prevention (Action Leadore) is the nations public-private partnership for suicide prevention, working with more than 250 national partners.   https://theactionalliance.org     National Strategy for Suicide Prevention & Risk Mitigation:  https://theactionalliance.org/our-strategy/national-strategy-suicide-prevention     [x] Fact Sheet:   https://www.hhs.gov/sites/default/files/national-strategy-for-suicide-prevention-factsheet.pdf     [x] Report:    https://www.ncbi.nlm.nih.gov/books/BBS977000/pdf/Bookshelf_NBK109917.pdf     Suicide Prevention Resource Center: The Suicide Prevention Resource Center (SPR) is the only federally supported resource center devoted to advancing the implementation of the National Strategy for Suicide Prevention. [facility] is funded by the U.S. Department of Health and Human Services' Substance Abuse and Mental Health Services Administration (SAMA).  https://www.[facility].org     [x] Safety Plan:   https://[employer]/wp-content/uploads/2021/08/Migue-Safety-Plan-8-6-21.pdf     [x] Suicide Risk Curve:  https://[employer]/wp-content/uploads/2021/08/Xdikzci-hmoe-qgued-8-6-21.pdf     [place] Mental Health Advocacy Service: the state agency tasked with protecting the legal rights of people with behavioral health diagnoses.  https://mhas.[place].[facility]     Alcoholics Anonymous (AA): find a meeting near you.  https://www.aa.org     SMI Adviser: resources for individuals and families with serious mental illness.  https://smiadviser.org     National [place] for the Mentally Ill (ELLIOT): the nation's largest grassroots organization dedicated to building better lives for individuals with mental illness.  https://www.elliot.org/Home     U.S. Department of Health and Human Services (HHS): the mission of HHS is to enhance the health and well-being of all Americans, by providing for effective health and human services and by fostering sound, sustained advances in the sciences underlying medicine, public health, and .   https://www.hhs.gov     Substance Abuse and Mental Health Services Administration (SAMHSA): SAMHSA is the agency within [facility] that leads public health efforts to advance the behavioral health of the nation. SAMHSA's mission is to reduce the impact of substance abuse and mental illness on [place] communities.   https://www.samhsa.gov     National Institutes of Health (NIH): a part of [facility], [facility] is  the largest biomedical research agency in the world.   https://www.nih.gov     National [place] on Drug Abuse (SHARLENE): sponsored by the NIH, the mission of SHARLENE is to advance science on drug use and addiction and to apply that knowledge to improve individual and public health.  https://sharlene.nih.gov     National [place] on Alcohol Abuse and Alcoholism (NIAAA): sponsored by the NIH, the mission of NIAA is to generate and disseminate fundamental knowledge about the effects of alcohol on health and well-being, and apply that knowledge to improve diagnosis, prevention, and treatment of alcohol-related problems, including alcohol use disorder, across the lifespan.   https://www.niaaa.nih.gov     National Harm Reduction Coalition: resources for harm reduction, including techniques, strategies, policy, and advocacy.  https://harmreduction.org     The SHARE Approach - A Model for Shared Decision Making:  [x] Fact Sheet  https://www.[facility].gov/sites/default/files/publications/files/share-approach_factsheet.pdf     AMA Principles of Medical Ethics - Informed Consent & Shared Decision Making:  [x] Chapter  https://www.ama-assn.org/system/files/2019-06/code-of-medical-nrzgtb-hgavhoz-5.pdf     Safety Netting for Primary Care:  [x] Article  https://www.ncbi.nlm.nih.gov/pmc/articles/RZH6048655/pdf/pqtqvov-3393--e70.pdf       MEDICATION MANAGEMENT:     [x] In addition to the potential beneficial effects, the use of any medication or drug (prescribed, over the counter or otherwise) carries with it the risk of potential adverse effects.  Each has a set of typical adverse effects - some common, some rare - but idiosyncratic and unanticipated reactions unique to you are always possible.      [x] It is important to remember that untreated illness can also pose a risk, which must be taken into account when weighing the pros and cons of a medication trial.    [x] Medications and drugs can sometimes interact with each other in the  body, leading to adverse effects - it is important that all your providers know all the medications and drugs you take - prescribed, over the counter, or otherwise.  Keep all your practitioners up to date with any changes.  It's always a good idea to keep an up-to-date list in an easily accessible location.    [x] There is an inherent unpredictability to all treatment, including the use of medication.  Unexpected outcomes can occur - keep me up to date with any difficulties you encounter.    [x] It is important to take medication as directed, and to comply fully with the instructions.  Check with the appropriate provider first before adjusting or stopping your medication on your own.    If you require further information pertaining to the issues outlined above, please reach out to your providers through the [facility] portal at [email or web address], or call [phone] to discuss.  See resource list for additional material.     Additional information can be provided pertaining to your diagnosis, intended outcomes, target symptoms for treatment, and possible benefits and risks of medication - you can also access this information through the provided resources.  Possible alternatives to the current treatment plan (including no treatment) can also be reviewed.      GENERAL HEALTH & WELLNESS:     [x] Establish and follow regularly with a primary care physician for routine health maintenance and management of any medical comorbidities.  [x] Follow a healthy diet, exercise routinely, and monitor weight and metabolic parameters.  [x] Allow adequate time for sleep and practice good sleep hygiene.  [x] Do not operate a motor vehicle or heavy machinery if the effects of medications or the symptoms underlying your condition impair the ability for you to do so safely.    Dietary Guidelines for Americans, 5513-7609:  U.S. Department of Agriculture  (USDA)  https://www.dietaryguidelines.gov/sites/default/files/2020-12/Dietary_Guidelines_for_Americans_2020-2025.pdf#page=31     The Nutrition Source:  [facility] of [facility] Health  https://www.[facility] [place].[facility]/nutritionsource       SLEEP HYGIENE:     Follow these tips to establish healthy sleep habits:  [x] Keep a consistent sleep schedule. Get up at the same time every day, even on weekends or during vacations.  [x] Set a bedtime that is early enough for you to get at least 7-8 hours of sleep.  [x] Don't go to bed unless you are sleepy.  [x] If you don't fall asleep after 20 minutes, get out of bed. Go do a quiet activity without a lot of light exposure. It is especially important to not get on electronics.  [x] Establish a relaxing bedtime routine.  [x] Use your bed only for sleep and sex.  [x] Make your bedroom quiet and relaxing. Keep the room at a comfortable, cool temperature.  [x] Limit exposure to bright light in the evenings.  [x] Turn off electronic devices at least 30 minutes before bedtime.  [x] Don't eat a large meal before bedtime. If you are hungry at night, eat a light, healthy snack.  [x] Exercise regularly and maintain a healthy diet.  [x] Avoid consuming caffeine in the afternoon or evening.  [x] Avoid consuming alcohol before bedtime.  [x] Reduce your fluid intake before bedtime.    QUICK TIPS FOR BETTER SLEEP  Reduce smartphone usage Create and maintain a nightly ritual Avoid caffeine 4-6 hours before sleeping Don't eat or drink too much at bedtime Sleep at the same time every night        American Academy of Sleep Medicine - Healthy Sleep Habits:  https://sleepeducation.org/healthy-sleep/healthy-sleep-habits     American Academy of Sleep Medicine - Bedtime Calculator:  https://sleepeducation.org/healthy-sleep/bedtime-calculator     American Academy of Sleep Medicine - Cognitive Behavioral Therapy for Insomnia (CBT-I):  https://sleepeducation.org/patients/cognitive-behavioral-therapy      American Academy of Sleep Medicine - Insomnia:  https://sleepeducation.org/sleep-disorders/insomnia       ALCOHOL & DRUG USE COUNSELING:     Preventing Excessive Alcohol Use (CDC):  https://www.cdc.gov/alcohol/fact-sheets/moderate-drinking.htm#:~:text=To%20reduce%20the%20risk%20of,days%20when%20alcohol%20is%20consumed.     [x] Alcohol consumption is associated with a variety of short- and long-term health risks, including motor vehicle crashes, violence, sexual risk behaviors, high blood pressure, and various cancers (e.g., breast cancer).  [x] The risk of these harms increases with the amount of alcohol you drink. For some conditions, like some cancers, the risk increases even at very low levels of alcohol consumption (less than 1 drink).  [x] To reduce the risk of alcohol-related harms, the 3766-3668 Dietary Guidelines for Americans recommends that adults of legal drinking age can choose not to drink, or to drink in moderation by limiting intake to 2 drinks or less in a day for men or 1 drink or less in a day for women, on days when alcohol is consumed.  [x] The Guidelines also do not recommend that individuals who do not drink alcohol start drinking for any reason and that if adults of legal drinking age choose to drink alcoholic beverages, drinking less is better for health than drinking more.  [x] The Guidelines note that some people should not drink alcohol at all, such as:  - If they are pregnant or might be pregnant.  - If they are younger than age 21.  - If they have certain medical conditions or are taking certain medications that can interact with alcohol.  - If they are recovering from an alcohol use disorder or if they are unable to control the amount they drink.  [x] The Guidelines also note that not drinking alcohol is the safest option for women who are lactating. Generally, moderate consumption of alcoholic beverages by a woman who is lactating (up to 1 standard drink in a day) is not known to  "be harmful to the infant, especially if the woman waits at least 2 hours after a single drink before nursing or expressing breast milk. Women considering consuming alcohol during lactation should talk to their healthcare provider.  [x] The Guidelines note, Emerging evidence suggests that even drinking within the recommended limits may increase the overall risk of death from various causes, such as from several types of cancer and some forms of cardiovascular disease. Alcohol has been found to increase risk for cancer, and for some types of cancer, the risk increases even at low levels of alcohol consumption (less than 1 drink in a day).  [x] Although past studies have indicated that moderate alcohol consumption has protective health benefits (e.g., reducing risk of heart disease), recent studies show this may not be true.  [x] Its important to focus on the amount people drink on the days that they drink. Even if women consume an average of 1 drink per day or men consume an average of 2 drinks per day, binge drinking increases the risk of experiencing alcohol-related harm in the short-term and in the future.    Drinking Levels Defined (NIAAA):  https://www.niaaa.nih.gov/alcohol-health/overview-alcohol-consumption/moderate-binge-drinking     Drinking in Moderation:  According to the "Dietary Guidelines for Americans 3960-1108, U.S. Department of Health and Human Services and U.S. Department of Agriculture, adults of legal drinking age can choose not to drink or to drink in moderation by limiting intake to 2 drinks or less in a day for men and 1 drink or less in a day for women, when alcohol is consumed. Drinking less is better for health than drinking more.    Binge Drinking:  NIAAA defines binge drinking as a pattern of drinking alcohol that brings blood alcohol concentration (BUTCH) to 0.08 percent - or 0.08 grams of alcohol per deciliter - or higher.  For a typical adult, this pattern corresponds to consuming 5 " or more drinks (male), or 4 or more drinks (female), in about 2 hours.    The Substance Abuse and Mental Health Services Administration (SAMHSA), which conducts the annual National Survey on Drug Use and Health (NSDUH), defines binge drinking as 5 or more alcoholic drinks for males or 4 or more alcoholic drinks for females on the same occasion (i.e., at the same time or within a couple of hours of each other) on at least 1 day in the past month.    Heavy Alcohol Use:  NIAAA defines heavy drinking as follows:  - For men, consuming more than 4 drinks on any day or more than 14 drinks per week.  - For women, consuming more than 3 drinks on any day or more than 7 drinks per week.     [facility] defines heavy alcohol use as binge drinking on 5 or more days in the past month.    Patterns of Drinking Associated with Alcohol Use Disorder:  Binge drinking and heavy alcohol use can increase an individual's risk of alcohol use disorder.    Certain people should avoid alcohol completely, including those who:  - Plan to drive or operate machinery, or participate in activities that require skill, coordination, and alertness.  - Take certain over-the-counter or prescription medications.  - Have certain medical conditions.  - Are recovering from alcohol use disorder or are unable to control the amount that they drink.  - Are younger than age 21.  - Are pregnant or may become pregnant.    U.S. Standard Drink  12 oz beer   (5% ABV) 8 oz malt liquor   (7% ABV) 5 oz wine   (12% ABV) 1.5 oz 80-proof distilled spirit  (40% ABV)        Heroin use harm reduction:  1. Carry naloxone. When using heroin, make sure you have at least one dose of naloxone - the overdose reversal drug - and have it in plain view. Understand how to give it.  2. Try a small dose first. It is best to first try a small amount of the heroin to check the effect.  3. Dont use heroin alone. Always use heroin with someone else and take turns while using.    It is possible to  overdose with heroin whether you are snorting, injecting or using it in another form.    Signs of an overdose or emergency:   - The person is awake but unable to talk.  - Their body is limp.  - Their breathing is shallow or slow or stopped.  - Their skin is pale, ashen or clammy/sweaty.  - They are unconscious.    In case of emergency, give naloxone. If you suspect the heroin may contain fentanyl, administer more than one dose. Seek medical help even if naloxone has been given. Call 911 for help.      ADHD TREATMENT AND STIMULANT MEDICATIONS:     [facility] Prescription Stimulants Drug Facts  CMS Stimulant and Related Medications: Use in Adults  JUS Drug Fact Sheets: Stimulants  FDA Drug Safety Communication: Stimulants  [facility] ADHD  WebMD ADHD Medications and Side Effects  [facility]: ADHD Medication      SHARED DECISION MAKING & INFORMED CONSENT:     Shared medical decision making and informed consent are the hallmark and bedrock of excellent clinical care.  During the encounter, shared medical decision making was employed and informed consent was obtained, to the degree possible, whenever feasible, appropriate and relevant. Those interventions are supplemented here with written materials, detailing the topics in more depth.       PSYCHOEDUCATION:     Psychoeducation pertaining to the following -     Diagnosis Etiology Disease Processes Natural Progression   Treatment Options Time Course Safety Netting Informed Consent   Intended Benefits of Medication Expectable Adverse Effects Target Symptoms for Treatment Alternatives to Current Treatment   Shared   Decision Making Risk Mitigation Strategies Harm Reduction Techniques Associated Bio-Med Complications     - can be further discussed and reviewed (you can also access additional information through the provided resources in this document).      Effective communication is essential in order to engage in shared medical decision making.  If you had difficulty understanding  anything during your encounter or in this supplementary document, please contact your providers through the [facility] portal at [email or web address] or call [phone].     Jose Armando Dictionary  https://dictionary.jose armando.org/us       It can be easy to miss, forget, or misremember important important information that was discussed during the session - especially when you're stressed, upset, or don't feel well.  If you or a representative have any additional questions, concerns, or topics to discuss - please contact your providers through the [facility] portal at [email or web address] or call [phone].    Memory Loss  https://www.[employer]/brain/memory-loss    Causes of Memory Loss  https://www.[employer]/what-causes-memory-loss-2699738    Memory loss: When to seek help  https://www.[facility].org/diseases-conditions/alzheimers-disease/in-depth/memory-loss/art-09567243    Memory, Forgetfulness, and Aging: What's Normal and What's Not?  https://www.emily.nih.gov/health/memory-forgetfulness-and-aging-whats-normal-and-whats-not    Depression and Memory Loss  https://www.[employer]/health/depression/depression-and-memory-loss    The Relationship Between Anxiety and Memory Loss  https://www.[facility]/academics/blog-posts/the-relationship-between-anxiety-and-memory-loss     PRESCRIPTION DRUG MANAGEMENT:     Prescription Drug Management entails the following:  [x] The review, recommendation, or consideration without recommendation of medications during the encounter.  [x] Discussion (to the extent possible) with the patient and/or other interested parties of the diagnosis, target symptoms, intended outcomes, and possible benefits and risks of medication, as well as alternatives (including no treatment), if not otherwise known or stated prior.  [x] Discussion (to the extent possible) with the patient and/or other interested parties of possible expectable adverse effects of any proposed individual  psychotropic agents, as well as the inherent unpredictability of treatment, if not otherwise known or stated prior.  [x] Informed consent is sought from the patient (and/or guardian/designated decision maker, if applicable) after a thorough discussion (to the extent possible) of the aforementioned points outlined above.  [x] The provision of counseling (to the extent possible) to the patient and/or other interested parties on the importance of full compliance with any prescribed medication, if not otherwise known or stated prior.    Information on psychotropic medication can be found at:   [place] of Mental Health: Information on Mental Health Medications      RISK MITIGATION, HARM REDUCTION & SAFETY NETTING:     Risk Mitigation Strategies, Harm Reduction Techniques, and Safety Netting are important interventions that can reduce acute and chronic risk.  As such, opportunities were sought to incorporate psychoeducation and practical advice pertaining to these topics into the encounter, to the degree possible, whenever feasible, appropriate and relevant.  Those interventions are supplemented here with written materials, detailing the topics in more depth.       RISK MITIGATION STRATEGIES:     Risk mitigation strategies are used to reduce the likelihood of future episodes of suicide, homicide, violence, and/or other problematic behaviors (e.g. self-injurious, risky, addictive, compulsive, impulsive). The following are examples of risk mitigation strategies which you can employ in order to reduce your overall burden of risk.     [x] Treatment of underlying psychopathology driving acute and chronic risk to the extent possible.  [x] Use of self administered rating scales and journaling to assist in risk tracking.  [x] Exploration of protective factors to potentially counterbalance risk.  [x] Identification and avoidance of triggers and situations that increase risk, including excessive alcohol and drug  use.  [x] Timely follow up and ongoing treatment of mental health issues moving forward.  [x] Full compliance with medication regimen.  [x] A good working knowledge of your medication regimen, including specific instructions on the administration of the medications.  [x] Consultation with an appropriate medical provider prior to altering or deviating from these instructions on your own.  [x] Active involvement and participation of family and natural support wherever feasible and possible.  [x] Development and review of coping strategies that can be immediately deployed in times of acute crisis.  [x] Implementation of home safety practices and the removal/reduction of access to lethal means (including, but not limited to, firearms, certain types and quantities of medication, poisons, or other methods you may have contemplated or identified).  [x] Collaborative development of a written safety plan with your treatment team and loved ones that can be immediately referred to in times of acute crisis.  [x] Utilization of a safety contract to engage your treatment team and further assess/manage risk.  [x] A good working knowledge of how to access emergency treatment in times of acute crisis.  [x] Utilization of suicide hotlines number (988) and resources in times of crisis.    If you require further information pertaining to the issues outlined above, please reach out to your providers through the [facility] portal at [email or web address], or call [phone] to discuss.  See resource list for additional material.      SAFETY NETTING:     In healthcare, safety netting refers to the provision of information to help patients or carers identify the need to consult a health care professional if a health concern arises or changes.  The relevance of this advice is most obvious with chronic mental illnesses, as their dynamic nature, with symptoms and signs emerging at different times and in different combinations, makes safety  netting particularly important.  Specific safety net advice for you includes the following:    [x] The existence of uncertainty. Mental health diagnoses and conditions contain at least some degree of uncertainty - knowing this, you should feel empowered to reconsult if necessary.  [x] What exactly to look out for. Given the recognised risk of possible deterioration or the development of complications, you should become familiar with the specific clinical features (including red flags) to look out for.    [x] How exactly to seek further help. You should know how and where to seek further help if needed.  Make a plan in advance and keep it handy.  It's also a good idea to share the plan with your treatment providers and loved ones.  [x] What to expect about time course. Mental health diagnoses and conditions often have an expected time course, which is important information for you to know.  However, if your difficulties do not conform to this time line and concerns arise, do not delay seeking further medical advice.    If you require further information pertaining to the issues outlined above, please reach out to your providers through the [facility] portal at [email or web address], or call [phone] to discuss.  See resource list for additional material.      HARM REDUCTION:     Harm Reduction techniques are used in an effort to reduce negative consequences associated with risky and maladaptive behaviors, until cessation of the problematic behaviors can be established.  Harm reduction is best thought of as a journey and not a destination; it is not an endorsement of problematic behavior, but an acknowledgement and recognition of the step-by-step nature of recovery.      Although commonly employed in working with people who suffer with drug addiction, harm reduction can be more broadly applied to any problematic behavior.    Harm Reduction and Substance Abuse:  [x] Incorporates a spectrum of strategies that  includes safer use, managed use, abstinence, meeting people who use drugs where theyre at, and addressing conditions of use along with the use itself.  [x] Accepts, for better or worse, that licit and illicit drug use is part of our world and chooses to work to minimize its harmful effects rather than simply ignore or condemn them.  [x] Understands drug use as a complex, multi-faceted phenomenon that encompasses a continuum of behaviors from severe use to total abstinence, and acknowledges that some ways of using drugs are clearly safer than others.  [x] Calls for the non-judgmental, non-coercive provision of services and resources to people who use drugs and the communities in which they live in order to assist them in reducing attendant harm.  [x] Affirms people who use drugs themselves as the primary agents of reducing the harms of their drug use and seeks to empower them to share information and support each other in strategies which meet their actual conditions of use.  [x] Does not attempt to minimize or ignore the real and tragic harm and danger that can be associated with illicit drug use.  [x] Meets people where they are, but seeks to not leave them there.  [x] Examples of specific interventions include, but are not limited to, narcan (naloxone), medication assisted treatment, syringe access, overdose prevention, and safer drug use techniques.    Key Harm Reduction Strategies: Opioid Use Disorder  [x] Safe Injection Sites & Equipment  [x] Managed Use  [x] Syringe Exchange Programs  [x] Fentanyl Test Strips  [x] Pharmacotherapy/Medication Assisted Treatment  [x] Narcan  [x] Good Buddhism Laws  [x] Treatment Instead of long-term  [x] Diversion Programs  [x] Overdose Education  [x] Abstinence    Whether or not you struggle with substance abuse, any and all opportunities to employ harm reduction techniques to address difficult to change problematic behaviors should be sought and implemented - whenever and  "wherever feasible, relevant and applicable. Additionally, harm reduction techniques can be applied broadly, and are relevant for a multitude of situations - even those that do not involve problematic or maladaptive behaviors.     EXAMPLES OF HARM REDUCTION IN OTHER AREAS  SUN SCREEN SEAT BELTS SPEED LIMITS BIRTH CONTROL        If you require further information pertaining to the issues outlined above, please reach out to your providers through the [facility] portal at [email or web address], or call [phone] to discuss.  See resource list for additional material.      FIREARM SAFETY:     THE SIX BASIC GUN SAFETY RULES  There are six basic gun safety rules for gun owners to understand and practice at all times:  Treat all guns as if they are loaded. Always assume that a gun is loaded even if you think it is unloaded. Every time a gun is handled for any reason, check to see that it is unloaded. If you are unable to check a gun to see if it is unloaded, leave it alone and seek help from someone more knowledgeable about guns.  Keep the gun pointed in the safest possible direction. Always be aware of where a gun is pointing. A "safe direction" is one where an accidental discharge of the gun will not cause injury or damage. Only point a gun at an object you intend to shoot. Never point a gun toward yourself or another person.  Keep your finger off the trigger until you are ready to shoot. Always keep your finger off the trigger and outside the trigger guard until you are ready to shoot. Even though it may be comfortable to rest your finger on the trigger, it also is unsafe. If you are moving around with your finger on the trigger and stumble or fall, you could inadvertently pull the trigger. Sudden loud noises or movements can result in an accidental discharge because there is a natural tendency to tighten the muscles when startled. The trigger is for firing and the handle is for handling.  Know your target, its " surroundings and beyond. Check that the areas in front of and behind your target are safe before shooting. Be aware that if the bullet misses or completely passes through the target, it could strike a person or object. Identify the target and make sure it is what you intend to shoot. If you are in doubt, DON'T SHOOT! Never fire at a target that is only a movement, color, sound or unidentifiable shape. Be aware of all the people around you before you shoot.  Know how to properly operate your gun. It is important to become thoroughly familiar with your gun. You should know its mechanical characteristics including how to properly load, unload and clear a malfunction from your gun. Obviously, not all guns are mechanically the same. Never assume that what applies to one make or model is exactly applicable to another. You should direct questions regarding the operation of your gun to your firearms dealer, or contact the  directly.  Store your gun safely and securely to prevent unauthorized use. Guns and ammunition should be stored separately. When the gun is not in your hands, you must still think of safety. Use an approved firearms safety device on the gun, such as a trigger lock or cable lock, so it cannot be fired. Store it unloaded in a locked container, such as an approved lock box or a gun safe. Store your gun in a different location than the ammunition. For maximum safety you should use both a locking device and a storage container.    ADDITIONAL SAFETY POINTS  The six basic safety rules are the foundational rules for gun safety. However, there are additional safety points that must not be overlooked.  [x] Never handle a gun when you are in an emotional state such as anger or depression. Your judgment may be impaired. If you have acute or chronic suicidal ideation, a suicide plan, or suicidal intent, have firearms removed and your access restricted by a trusted loved one or other responsible individual  "or agency.  [x] Never shoot a gun in celebration (the Fourth of July or New Year's Anh, for example). Not only is this unsafe, but it is generally illegal. A bullet fired into the air will return to the ground with enough speed to cause injury or death.  [x] Do not shoot at water, flat or hard surfaces. The bullet can ricochet and hit someone or something other than the target.  [x] Hand your gun to someone only after you verify that it is unloaded and the cylinder or action is open. Take a gun from someone only after you verify that it is unloaded and the cylinder or action is open.  [x] Guns, alcohol and drugs don't mix. Alcohol and drugs can negatively affect judgment as well as physical coordination. Alcohol and any other substance likely to impair normal mental or physical functions should not be used before or while handling guns. Avoid handling and using your gun when you are taking medications that cause drowsiness or include a warning to not operate machinery while taking this drug.   [x] The loud noise from a fired gun can cause hearing damage, and the debris and hot gas that is often emitted can result in eye injury. Always wear ear and eye protection when shooting a gun.      GUNS AND CHILDREN - FIREARM OWNER RESPONSIBILITIES    You Cannot Be Too Careful with Children and Guns  [x] There is no such thing as being too careful with children and guns. Never assume that simply because a toddler may lack finger strength, they can't pull the trigger. A child's thumb has twice the strength of the other fingers. When a toddler's thumb "pushes" against a trigger, invariably the barrel of the gun is pointing directly at the child's face. NEVER leave a firearm lying around the house.  [x] Child safety precautions still apply even if you have no children or if your children have grown to adulthood and left home. A nephew, niece, neighbor's child or a grandchild may come to visit. Practice gun safety at all " "times.  [x] To prevent injury or death caused by improper storage of guns in a home where children are likely to be present, you should store all guns unloaded, lock them with a firearms safety device and store them in a locked container. Ammunition should be stored in a location separate from the gun.    Talking to Children About Guns  [x] Children are naturally curious about things they don't know about or think are "forbidden." When a child asks questions or begins to act out "gun play," you may want to address his or her curiosity by answering the questions as honestly and openly as possible. This will remove the mystery and reduce the natural curiosity. Also, it is important to remember to talk to children in a manner they can relate to and understand. This is very important, especially when teaching children about the difference between "real" and "make-believe." Let children know that, even though they may look the same, real guns are very different than toy guns. A real gun will hurt or kill someone who is shot.    Instill a Mind Set of Safety and Responsibility  [x] The American Academy of Pediatrics reports that adolescence is a highly vulnerable stage in life for teenagers struggling to develop traits of identity, independence and autonomy. Children, of course, are both naturally curious and innocently unaware of many dangers around them. Thus, adolescents as well as children may not be sufficiently safeguarded by cautionary words, however frequent. Contrary actions can completely undermine good advice. A "Do as I say and not as I do" approach to gun safety is both irresponsible and dangerous.  [x] Remember that actions speak louder than words. Children learn most by observing the adults around them. By practicing safe conduct you will also be teaching safe conduct.    Safety and Storage Devices  [x] If you decide to keep a firearm in your home you must consider the issue of how to store the firearm in a " safe and secure manner. There are a variety of safety and storage devices currently available to the public in a wide range of prices. Some devices are locking mechanisms designed to keep the firearm from being loaded or fired, but don't prevent the firearm from being handled or stolen. There are also locking storage containers that hold the firearm out of sight. For maximum safety you should use both a firearm safety device and a locking storage container to store your unloaded firearm.   Two of the most common locking mechanisms are trigger locks and cable locks. Trigger locks are typically two-piece devices that fit around the trigger and trigger guard to prevent access to the trigger. One side has a post that fits into a hole in the other side. They are locked by a key or combination locking mechanism. Cable locks typically work by looping a strong steel cable through the action of the firearm to block the firearm's operation and prevent accidental firing. However, neither trigger locks nor cable locks are designed to prevent access to the firearm.   [x] Smaller lock boxes and larger gun safes are two of the most common types of locking storage containers. One advantage of lock boxes and gun safes is that they are designed to completely prevent unintended handling and removal of a firearm. Lock boxes are generally constructed of sturdy, high-grade metal opened by either a key or combination lock. Gun safes are quite heavy, usually weighing at least 50 pounds. While gun safes are typically the most expensive firearm storage devices, they are generally more reliable and secure.     Remember: Safety and storage devices are only as secure as the precautions you take to protect the key or combination to the lock.    RULES FOR KIDS  Adults should be aware that a child could discover a gun when a parent or another adult is not present. This could happen in the child's own home; the home of a neighbor, friend or  relative; or in a public place such as a school or park. If this should happen, a child should know the following rules and be taught to practice them.   Stop  The first rule for a child to follow if he/she finds or sees a gun is to stop what he/she is doing.  Don't Touch!  The second rule is for a child not to touch a gun he/she finds or sees. A child may think the best thing to do if he/she finds a gun is to pick it up and take it to an adult. A child needs to know he/she should NEVER touch a gun he/she may find or see.  Leave the Area  The third rule is to immediately leave the area. This would include never taking a gun away from another child or trying to stop someone from using gun.  Tell an Adult  The last rule is for a child to tell an adult about the gun he/she has seen. This includes times when other kids are playing with or shooting a gun.     METHODS OF CHILDPROOFING YOUR FIREARM  As a responsible handgun owner, you must recognize the need and be aware of the methods of childproofing your handgun, whether or not you have children.  Whenever children could be around, whether your own, or a friend's, relative's or neighbor's, additional safety steps should be taken when storing firearms and ammunition in your home.  [x] Always store your firearm unloaded.  [x] Use a firearms safety device AND store the firearm in a locked container.  [x] Store the ammunition separately in a locked container.  Always storing your firearm securely is the best method of childproofing your firearm; however, your choice of a storage place can add another element of safety. Carefully choose the storage place in your home especially if children may be around.  [x] Do not store your firearm where it is visible.  [x] Do not store your firearm in a bedside table, under your mattress or pillow, or on a closet shelf.  [x] Do not store your firearm among your valuables (such as jewelry or cameras) unless it is locked in a secure  container.  [x] Consider storing firearms not possessed for self-defense in a safe and secure manner away from the home.    [facility] Gun Safety:  https://www.everytown.org       Gun Violence: Prediction, Prevention and Policy  American Psychological Association Panel of Experts Report  https://www.apa.org/pubs/reports/gun-violence-report.pdf     If you require further information pertaining to any of the issues outlined above, please reach out to your providers through the [facility] portal at [email or web address], or call [phone] to discuss.  See resource list for additional material.      IN CASE OF SUICIDAL THINKING, call the [facility] Suicide Hotline Number: 988    988 Suicide & Crisis Lifeline: 988 , 8-255-621-TALK (8255)  Provides 24/7, free and confidential support for people in distress, prevention and crisis resources for you or your loved ones, and best practices for professionals.    Call, text or chat.  https://Channelinsight.[employer]              REFERRAL RECOMMENDATIONS FOR SUBSTANCE ABUSE & MENTAL HEALTH      IN CASE OF SUICIDAL THINKING, call the [facility] Suicide Rally Fitline Number: 988    988 Suicide & Crisis Lifeline: 988 , 4-906-378-TALK (8255)  https://Channelinsight.[email or web address]       SUBSTANCE ABUSE:     [facility] (formerly known as the ABU)  [x] [phone], Option 2  [x] [address], [place] [address]  [x] [email or web address]  [x] [facility] delivers comprehensive and collaborative treatment for alcohol and substance use disorders.  Excellent program for working professionals or anyone else seeking recovery.  [x] Requires insurance approval prior to starting program, call number above for more information.  [x] Intensive Outpatient Rehabilitation Program - M-F 9am-3pm - daily groups with psychologists and social workers, sessions with MDs 3x per week   [x] Ambulatory detox and dual diagnosis  available      SUBOXONE:     NOTE: some Suboxone clinics require their clients to participate in a structured program (such as an IOP) in order to be prescribed Suboxone.  Some clinics have a long waiting list.  Most of these clinics do not accept walk-in clients, so call first to to learn what must be done to get started on Suboxone.    [facility] - [phone] (can do Sublocade)  [address], [place] [address]    [facility]  [address] [facility], [place]  [phone]    [facility] - [phone] (can do Sublocade)  [address] [place] [address]    [facility]  [address] [place]  [phone]     [facility] (very short waiting list, may accept some walk-in's but call first if possible)  [address], [place] [id number] [phone]; [phone]    [facility]   [address] [place]    [phone]    [facility] (can usually be seen within a week but is cash only for appointment)  [address] [place] [address] [place], [place]    [facility]  [address] [place]  [phone]    [facility] ([facility])  [address], [place] [id number] [phone]    [place], [place]:    [facility] - [address] [place] [place] [address] - Tel: [phone]    Caleb Matson - [address] [place] [address] - Tel: [phone]    Rolando Lynne - [address] [place] [address] - Tel: [phone]    Kris Yates - [address] [place] [address] - Tel: [phone]    Jorge Luis Galindo - [address] [place] [address] - Tel: [phone]    [place], [place]:     Dr. Terri Abarca and Dr. Jaquan Muñoz - [address] [facility] Tel: [phone]    Dr. Catalina Aranda - [address] [place] [address] [place] - Tel: [phone]    Dr. Kaushal Valdovinos - Tel: [phone]    Dr. Adrian Fatima - [facility] - [phone]      METHADONE:     [facility] (the only methadone clinic in the  city, has two locations)  [x] [place] [facility] [place] [address], [phone]  [x] [facility] [place] [address], [phone]      12 STEP PROGRAMS (and similar):     Alcoholics Anonymous (local)  [x] [phone]  [x] www.[place].org for schedules for in-person and online meetings  [x] There are AA meetings throughout the day all over town  [x] AA costs nothing to attend; they pass a basket for donations but this is not required    Narcotics Anonymous  [x] [phone]  [x] www.noana.org  [x] There are NA meetings throughout the day all over [facility]  [x] NA costs nothing to attend; they pass a basket for donations but this is not required    Alcoholics Anonymous Online Intergroup (national)  [x] www.aa-intergroup.org  [x] Good resource for large, nation-wide meetings  [x] Can also attend smaller, local meetings in other cities  [x] Countless meetings all day and all night  [x] AA costs nothing to attend; they pass a basket for donations but this is not required    [employer] - 24/7 zoom meetings for women and coed - sign on anytime, anywhere!  [email or web address]-snzgjqiz/    Online Intergroup of AA - [address] Meeting - 24/7 zoom meetings  https://aa-intergroup.org/meetings/    LOOKING FOR AN ALTERNATIVE TO 12 STEP PROGRAMS - check out:  [facility]: https://www.smartrecovery.org/about-us  [facility]: [email or web address]      DETOX UNITS (USUALLY 5-7 DAYS):     [facility]: [address], [place]  [phone], call first to ensure bed availability    [facility]: [address] [place]  [phone], Option 1, call first to ensure bed availability    [facility]: [address], [place]  [phone] (intake by appointment only)    [facility]: [address], [place]  [phone]      INTENSIVE OUTPATIENT PROGRAMS:     [facility] (formerly known as the [facility]BU)  [x]  [phone], Option 2  [x] [address], [facility] [address]  [x] [email or web address]  [x] [facility] delivers comprehensive and collaborative treatment for alcohol and substance use disorders.  Excellent program for working professionals or anyone else seeking recovery.  [x] Requires insurance approval prior to starting program, call number above for more information.  [x] Intensive Outpatient Rehabilitation Program - M-F 9am-3pm - daily groups with psychologists and social workers, sessions with MDs 3x per week   [x] Ambulatory detox and dual diagnosis available    [facility] Intensive Outpatient Program  [x] [phone]  [x] [facility] (the clinic not on [facility])  [x] Call number above for more info and to check insurance requirements    [facility]  [address] [place] [address] [phone]    [facility]:  [address] [place], [place] [address]?, [phone]  [address] [place], [place] [address]?, [phone]    RESIDENTIAL REHABS (USUALLY 28 DAYS):     [facility]: [address], [phone]    [facility]: [facility] , [facility]  [phone] (intake by appointment only)    [facility] (men only) [address] [facility], [phone]    [facility] (Female only) [address] [facility], [phone]    [facility]: [address], [facility], men's program [phone], women's program [phone]    [facility]: [address], [facility], [phone]    [facility]: [address], [place], [phone]    [facility]: Men only, [phone], [address] [place]    [facility]: [address], [place], [place], [phone]    [facility]: [address] [place] [address],  [phone]  New Location: [address] [place] [address] [address],  [place] [address], [phone]    [facility]:   ?[address]?[place], [place] [address]?[phone]    [place]: [address], [place] [address], [phone]    [place]: [place], [phone]     [facility]: [place], [place], [phone]    [facility]: [place], [place], [phone]    [facility]: [place], [place], [phone]    [place]: [place], [phone]    [facility]: [address] [place] [address], [phone]    COMMUNITY ADDICTION CLINICS:     [facility]: [address] [facility], [facility] [place], [phone] -or- [address] [place] [address]    [facility] [place]: [address] [facility] [place]  [address]     [facility]: Clinics [phone]; Crisis [phone]    [facility]: [address] [place] [address]    [facility]: [address] [place] [address]    [facility]: [address], [place] [address]: 2nd Floor [address] [place] [address]    [facility]: [address], [place] [address]    [facility], [place] [address]    [facility]: [address] [place] [address] [phone]  (serves youth 16-23 years old)    [facility]: [facility]/[place]/[facility] [phone]    [facility]: [address] [facility], JEREMI [phone]    [facility]: [address], [facility] [phone]    [facility]: [address] [facility], [address], [phone]     [facility]: [address] [facility] Aimee Machado, [phone], [phone]    RESOURCES IN OTHER [facility]:     [facility]: [facility] F. Niko Coats, [place]  "[place], [phone], [phone]    [facility]: [address] [facility], [address], [phone]    [facility], [address] [place] [address], [phone]    [facility]: [address] [facility], [phone]    [facility], [address] [facility], [phone] ([facility])    [facility], [address] [facility], [phone] ([facility])    [facility], [address] [facility], [address], [place], [phone] ([facility], [facility])    [facility], [address] [facility], [phone] ([facility])    [facility] [place] Hotline [phone], [phone]    [facility], 157 [facility], [facility], 232 [facility], [address], [facility] Behavioral [facility], 1809 [facility] Behavioral [facility], 500 [facility]. [address]., [facility] Behavioral [facility], [address], [facility] Human [facility], [address], [address] [phone]    [facility], 302 [facility] [phone]    [facility], [address] [facility], [phone]    [facility] for Addiction Recovery, 85 [facility], [phone]      Y[place]ut SPEAKING (en español):     Información de la reunión de Alcohólicos Anónimos  [facility], 10:00 am  Habla español  Esta reunión está abierta y cualquiera puede asistir.    Uzbek speaking Alcoholics anonymous meetings:  El "Krish [place] AA Skype" es un krish on line de Alcohólicos Anónimos en español. El krish es zarina, gratuito y virtual a través de Skype Audio. El krish funciona mediante jamey llamada grupal de " voz, por lo que no se utiliza la videollamada, ni se pueden kary las imágenes o rostros de los participantes. Hace grace años y medio abrimos el primer Krish de AA por [place] en Minister[place], zoe actualmente asisten personas desde [place], [place], [place], [place], [place], [place], [place], [place], [place], [place], [place] y [place], entre otros.    El krish es muy útil para los alcohólicos que residen en lugares donde no se celebran reuniones de AA, o residen en lugares donde las reuniones de AA son un número limitado de días a la semana, o para aquellos compañeros que se hayan de viaje o que, por cualquier motivo, se hayan convalecientes y no pueden desplazarse. Todos los días nos reuniones a las 21:00 (hora española)    Podéis obtener más información sobre el krish y kelvin sesiones en la página web [email or web address]      MENTAL HEALTH:     [facility] Out[facility]  [phone]    [facility] - General Psychiatry Intensive Outpatient Program  [facility] (formerly known as the [facility]U)  [phone], option 3    [facility] - Dual Diagnosis Intensive Outpatient Program  [facility] (formerly known as the [facility]BU)  [phone], option 2      COMMUNITY MENTAL HEALTH CENTERS:     [facility]  (aka [facility], [facility])  Serves [facility], and [facility] residents.  Serves uninsured patients & those with Medicaid.  Main location: [address] [place] [address]  [phone]  Walk-in's available during regular business hours.  24/7 Crisis Line: [phone]    [facility]  (aka [facility], [facility])  Serves [facility].  Serves uninsured patients, those with Medicaid and some private plans.  Walk-in's available during regular business hours.  Primary care services available  as well.  [facility]: [address] [facility] [address] [place] [address];  [phone]  [place]: [address] [place] [address];  [phone]  24/7 Crisis Line: [phone]    [facility]  Serves uninsured patients & those with Medicaid, call for more info.  Primary care, pediatrics, HIV treatment, and dentistry services available as well.  Three locations.  [phone]    [facility] [place]?Corporate Office  Serves patients with Medicaid, Medicare, and private insurance  [address]  [place],?[place] [address]  [phone]    [facility]  Serves uninsured on a sliding scale, as well as Medicaid, Medicare, and private plans.  Eight locations around the [facility].  [phone]    [facility]  Serves uninsured patients & those with Medicaid, private insurances.  Primary care available as well.  [phone]  [address] [place] [address]    [facility]  Serves veterans who were honorably discharged.  [address] [place] [address]  [phone]  24/7 Veterans Crisis Line: [phone] (Press 1)    If you have private insurance and need to find a specialist, please contact your insurance network to request a list of providers covered by your benefits.      MENTAL HEALTH/ADDICTIVE DISORDERS:     AA ([phone]), NA ([phone])   National Suicide Prevention Lifeline- Call 1-259.130.9683 Available 24 hours everyday  [facility] [phone]; Crisis Line [phone] - Call for options A-F:  Intensive Outpatient Treatment/ Day programs   [facility], please contact   [facility], please contact [phone] or [phone] to speak with an admissions counselor.  [facility] (Methadone Maintenance)   [address] [place] [address], [phone]  [address] [place] [address] [phone]  [facility], [address] [place] [address], [phone]  [facility] [phone]  [facility] please contact [phone]  [facility], [address], 4th [place] [address] Phone: [phone]   [facility]  [place] Office: [address] [place] [address], [phone]  [place] Office: [address] [facility], [address], [place] [address], [phone]  [place] Office: [address] [place], [place] [address] [phone]    Outpatient Substance Abuse Treatment   [facility] (Methadone Maintenance)   [address] [place] [address], [phone]  [address] [place] [address] [phone]  [facility], [address] [place] [address], [phone]  [facility]  [place] Office: [address] [place] [address], [phone]  [place] Office: [address] [facility], [address], [place] [address], [phone]  [place] Office: [address] [facility] [place] [address] [phone]  [facility], [address] [place] [address] [phone]   [facility], [address] [facility], [address], [phone]  [facility], [address] [place], [place] [phone]    Residential Substance Abuse Treatment   [facility] [address] [facility], [phone] or [phone]  [facility], 4[address] [facility], [phone]  [facility] (men only) [address] [place] [address], [place] [address], [phone]  [facility] at the [facility] (women only) [address] [place] [address] [phone]  [facility], [address] [place] [address] [phone]  [facility] (women only), intakes at 4[address] [facility], [phone]  [facility] (7-day program, $100, [address] [place], [phone], [phone], [phone])  [facility]  (Men only, [phone]), [address] (Vets*/Non-Vets)  [facility] (Men only, $400/month program fee) [address], [phone]  [facility] (Women over age 39 only), [address] [facility] [place], [phone]    Out of Area:    [place], [address], [place], [place] ([phone])  [facility] (men only), [address] [place] [address], [phone]  [facility], [address] [place], [place] [phone])  [place] [place], [place] ([phone])  [facility], 111 [facility], [phone]  [facility] (Women only, has to have mental illness, can be homeless or substance abuser), [phone]        DOMESTIC VIOLENCE RESOURCES:     Advocacy  [facility] (NOFJC)  [address] [address]    [facility] ? [phone]  Services provided: emergency shelter, individual advocacy, information and referrals, group support, children's program, medical advocacy, forensic medical exams, primary care, legal assistance, counseling, safety planning, and caregiver support    [facility] HEALING AND EMPOWERMENT [place]  Confidential location  [facility] ? [phone]  Services provided: short term emergency shelter, all services provided are free of charge    [facility] CENTERS FOR COMMUNITY ADVOCACY  Multiple locations in [place], [facility], [place], [facility], [place], and [facility] ([place], [place], and [place])    JULIO CESAR ? [phone]  Services provided: emergency shelter, individual advocacy, information and referrals, group support, children's program, medical advocacy, legal assistance in obtaining restraining orders, counseling, safety planning, and caregiver support    [facility]   Emergency Shelter   [phone]  Emergency Services ,Legal and Financial Assistance Services ,Housing Services ,Support Services      [facility]   [phone]  Emergency Services ,Counseling Services , Housing Services ,Support Services ,Children's Services     [facility]OMEN WITH A VISION  [address] [place] [address]  WWAV ? [phone]  Services provided: advocacy, health education and supportive services, specializing in free healing services for marginalized groups, including LGBTQ individuals and sex workers    SEXUAL TRAUMA AWARENESS AND RESPONSE (STAR)  [address] [place] [address]    STAR ? (969) 742-STAR  Services provided: individual advocacy, information and referrals, group support, medical advocacy, legal assistance, counseling, and safety planning for survivors of sexual assault    [facility])  [address] [place] [address]  [facility] Forensic Program ? [phone]  Services provided: free forensic medical exams for sexual assault and domestic violence, which can be performed up to 5 days after an incident. It is not necessary to make a police report to receive a forensic medical exam    Legal  [facility]  [address] [address]  Project [facility] ? [phone]  Services provided: free emergency legal representation for survivors of doemstic violence residing in [facility]. Legal services may include temporary restraining orders, temporary child support, custody, and use of property    [facility] LEGAL SERVICES (LS)  [address] [address]  SLLS ? [phone]  Services provided: free legal representation for survivors of domestic violence residing in [facility]. Legal services may include temporary child support, custody, and divorce      HOTLINES:     [facility] DOMESTIC VIOLENCE HOTLINE  [phone]    Services provided: free and confidential hotline for victims and survivors of domestic violence. All calls will be routed to a domestic violence service provided in the  victim or survivor's area    NATIONAL HUMAN TRAFFICKING HOTLINE  [phone]    Services provided: national anti-trafficking hotline serving victims and survivors of human trafficking. Provides information about local resources, and access to safe space to report tips, seek services, and ask for help    VIA LINK  211 or [phone]    Service provided: counselors can provide crisis counseling. Counselors can also provide information and referrals to programs which can help with needs such as food, shelter, medical care, financial assistance, mental health services, substance abuse treatment, senior services, childcare, and more      HOMELESS SHELTERS:      Homeless shelters  [facility]  Emergency shelter for individuals and families  [address] [facility]  [phone]  [facility]  Emergency shelter for men only  Meals daily 6am, 2pm, & 6pm  Clothing, case management M-F by appointment (ID/job/housing/legal assistance), mail  [address] [facility]  [phone]  [facility]  Emergency shelter for men  [address] Micheline [facility]  [phone]  Emergency shelter for women  [address] [facility]  [phone]  Breakfast & lunch daily, dinner M-F  Case management, job counseling services   [facility]  Emergency shelter for teens and young adults up to 22yo  [address] [facility]  [phone]  [place] [facility]  Emergency shelter for women over 19yo and their kids  [address] [place] [address]  [phone]  [facility]  Day program, meals M-F 1PM (arrive early)  Showers, laundry, hygiene kits, showers, phones, , notary services, case management, ID assistance  15[address] [facility]  [phone] M-F 8am-2:30pm  [facility]  Day program  MTWF 7:30am-3:30pm,  8:30am-3:30pm  Crisis intervention, employment assistance, food/clothing, hygiene kits, bus tokens, mail  [address] [facility] B  [phone]  [facility]  Mobile outreach for homeless persons in  [facility]  [phone]  Healthcare for the Homeless  Primary healthcare, case management, dental services, TB placement  Call ahead  [address]  2nd Floor  [phone]  [place] at the [facility]  Connects homeless people with their loved ones in other cities by providing transportation costs   [phone]      [place] RESOURCES:     [place] Mobile Mental Health Crisis Response Team:    Region 12 ([place], [place], [place], and [facility]) ([facility])  [phone]      Outpatient Mental Health & Addiction Clinic Resources for both [facility]:    [facility] Mental Healthcare Resources  Website: www.[facility].org  Main Number: 845-046-2883    [facility] ([facility])  [address] ([facility]) [place] [address]  [phone]    [facility] ([facility])  [address] ([address] [facility]) [place] [address]  [phone]    [facility]  [address] ([address]) [place] [address]  [phone]    [facility]  [address] ([address] [facility]) [place] [address]  [phone]      Addiction Treatment Resources for both [facility]:    [facility] (Detox, Residential, PHP, IOP, and Aftercare Programs)  [address] [place] [address]  [phone]    [facility] (Residential, IOP, Transitional Living, and Aftercare Programs)  [address] [place] [address]  [phone]    [facility] (Inpatient, Residential, Detox, IOP, Outpatient, and Aftercare Programs)  [address] [facility], [place] [address] [phone] or toll free at [phone]      Outpatient Mental Health Psychotherapy Resources for both [facility]:    Dianelys Marti, [facility]  [address]  [place] [address]  [phone]  Specialties: Depression, Anxiety, and Life Transitions    Daniela Mcknight, PhD  [address]  [place] [address]  [phone]  Specialties: Testing and Evaluation, Education and Learning Disabilities, and ADHD    Tomeka Osborne, [facility] [address] [place] [address]  [phone]  Specialties: Obsessive-Compulsive (OCD), Depression, and Relationship Issues    Serena Queen [facility] [address] [facility] [place] [address]  [phone]  Specialties: Trauma & PTSD, Mood Disorders, and Anxiety    Serena Olivares, PhD, [facility]  [facility] [address] [place] [address]  [phone]  Specialties: Family Conflict, Child, and Relationship Issues    Justyna Oliveira [facility] Counseling [place] [address] [phone]  Specialties: Anxiety, Depression, and Anger Management        IN CASE OF SUICIDAL THINKING, call the National Suicide Hotline Number: 988    988 Suicide & Crisis Lifeline: 988 , 3-077-727-TALK (8255)  Provides 24/7, free and confidential support for people in distress, prevention and crisis resources for you or your loved ones, and best practices for professionals.    Call, text or chat.  https://988Sevenpopline.org

## 2024-08-27 NOTE — CARE UPDATE
Patient admitted from home by parents d/t AMS. It was felt that his taking of gummies put him in a very confused state. He has diagnosis of oppositional defiant disorder and does not communicate with others very well. The father makes note that if he chooses not to speak tpo you, then he is at his baseline. On my exam he refuses to answer questions. No significant findings on physical exam. Vitals and labs are stable.    Patient is medically cleared for a discharge to inpatient psychiatry facility.    Tele psych did an evaluation and recommended PEC as he is gravely ill.  H eis not a harm to self or others.      He was given trazodone to sleep and benadryl.    OK for transfer.

## 2024-08-27 NOTE — CODE 44
"MEDICARE CONDITION 44    (Reference: Transmittal 200 of the Medicare Manual)    A Medicare "Inpatient Admission" may be changed to an "Outpatient" (includes  Outpatient Observation) status, if the following conditions exist:  The change in patient status from inpatient to outpatient is made prior to        discharge or release, while the patient is still a patient in the hospital.   The hospital has not submitted a claim for the inpatient admission.  A physician concurs with the Utilization Committee decision.   Physician concurrence with the Utilization Committee's decision is documented         in the patient's records.         IMPLEMENTATION OF CONDITION CODE 44    Inpatient status has been reviewed prior to discharge. Change to Outpatient Observation status, in accordance with Condition Code 44.     By entering this in the medical record, I verify that I have reviewed and concur with the findings of the Utilization Review Committee and the Utilization Review Physician, and that the identified Patient does not meet medical necessity for Inpatient status. This patient is appropriate for the downgrade in status because upon subsequent review, it was determined that the patient did not meet the medical necessity for inpatient care. Outpatient Observation is the identified level of care appropriate for the Patient, and all of the above conditions for this status change have been met.     Yaa Jenkins MD  Hospital Medicine  "

## 2024-08-27 NOTE — ASSESSMENT & PLAN NOTE
Acute, has reportedly not been a chronic or recurring issue for patient, but it seems very temporally associated with his abnormally quantity of gummies ingested on 8/23/24. I suspect that he has an underlying, under-recognized and non-medicated mood disorder (such as schizoaffective) or personality (schizotypal) that has been exacerbated/unmasked by his recent substance exposure and proximity of that exposure to his interactions with his caring family. Since patient does habitually require a sleep aid, will give trazodone 100mg tonight, but otherwise avoid Rx which will over-sedate for tomorrow's eval. Get Neuro and Psych consults in AM, per family request, since this is a relatively new condition and change. Currently no e/o infxn; TSH is elevated but irrelevant in this situation, as hypothyroid would not account for his sx. Check RPR/HIV/B12/Folate. D/w Dr. 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

## 2024-08-27 NOTE — ED NOTES
Pt laying in bed in no obvious distress as this time. Pt's father continues to stay at bedside. Pt's bed in lowest and locked position with rails up x2. Pt denied any needs at this time. Environment remains safe and the only moveable object is the vital machine at bedside due to medical necessity. Sitter remains in full view of the pt at all times.

## 2024-08-27 NOTE — PLAN OF CARE
Discharge orders and chart reviewed. No other discharge needs noted at this time. Pt is clear for discharge from case management. Pt is discharging to psych facility.    Father at bedside notified of intended transfer - denied additional needs    SPD to transport     08/27/24 1211   Final Note   Assessment Type Final Discharge Note   Anticipated Discharge Disposition Psych   What phone number can be called within the next 1-3 days to see how you are doing after discharge? [phone]   Post-Acute Status   Post-Acute Authorization Placement   Post-Acute Placement Status Set-up Complete/Auth obtained   Discharge Delays (!) Ambulance Transport/Facility Transport

## 2024-08-27 NOTE — H&P
"  [facility] - Emergency Dept  Hospital Medicine  History & Physical    Patient Name: Fidencio Torres  MRN: [id number]  Patient Class: IP- Inpatient  Admission Date: 8/26/2024  Attending Physician: Kris Alegre MD   Primary Care Provider: No primary care provider on file.         Patient information was obtained from parent and ER records.     Subjective:     Principal Problem:Shared psychotic disorder    Chief Complaint:   Chief Complaint   Patient presents with    Mental Health Problem     Pt brought in by family for eval d/t pt being altered. He took unknown amount of delta 9 gummies. he also stated to family he was suicidal PTA, but denies now.        HPI: 39M p/w acute behavioral changes with onset at 12:48p on 8/26/24. Patient's father provides the hx. He reports that patient had otherwise been in his usual state of health until he began unusually perseverating. The foci of his perseverations shifted from topic to topic, but he remained abnormally focused and unable to be redirected or regain a semblance of what a typical day would be. His father described this as being "confused", although the patient demonstrated such perseveration on minute subjects/tasks/objects that it was difficult to engage him when I came into evaluate him. He was not agitated, or anxious appearing, but rather hypervigilant and perseverating.     His father reports that he spends a considerable amount of time alone, playing video games, or out of state with a female partner. Patient had recently returned to [place] to have "his teeth cleaned", as he has particularly poor dentition/self-care. The patient went to a gas station w/ his father and purchased Delta 9 gummies (although, per the , patient attempted to purchase something that was allegedly "illegal"), and his father reports that patient typically cuts these in half and takes 1/2 gummy per night as a sleep aid. Patient had been taking them in that manner, " "until 8/23/24 when he presumably took all of the final 4 gummies in the package. Per father, the remaining 4 disappeared. Patient does not deny that he consumed all of them. His father does deny that he had any other ingestants, and his last "vaping" was weeks PTA.    Patient reportedly had some kind of behavioral hospitalization at age 7, has what his family identifies as "oppositional defiant disorder", and then had testicular cancer treated approx 20 years.    Past Medical History:   Diagnosis Date    Cancer 2009    Testicular        Past Surgical History:   Procedure Laterality Date    testical removal Right 2009       Review of patient's allergies indicates:   Allergen Reactions    Iodine and iodide containing products Anaphylaxis       No current facility-administered medications on file prior to encounter.     No current outpatient medications on file prior to encounter.     Family History       Problem Relation (Age of Onset)    No Known Problems Mother, Father, Sister          Tobacco Use    Smoking status: Never    Smokeless tobacco: Never   Substance and Sexual Activity    Alcohol use: Not Currently     Comment: 1 beer on holidays    Drug use: Not Currently     Types: Marijuana    Sexual activity: Yes     Partners: Female     Birth control/protection: None     Review of Systems   Constitutional:  Positive for activity change. Negative for appetite change, chills and fever.   Psychiatric/Behavioral:  Positive for behavioral problems, confusion, sleep disturbance and suicidal ideas. The patient is hyperactive.      Objective:     Vital Signs (Most Recent):  Temp: 98.2 °F (36.8 °C) (08/26/24 1827)  Pulse: 70 (08/26/24 1827)  Resp: 16 (08/26/24 1827)  BP: 122/64 (08/26/24 1827)  SpO2: 96 % (08/26/24 1827) Vital Signs (24h Range):  Temp:  [98.2 °F (36.8 °C)] 98.2 °F (36.8 °C)  Pulse:  [70] 70  Resp:  [16] 16  SpO2:  [96 %] 96 %  BP: (122)/(64) 122/64     Weight: 68 kg (150 lb)  Body mass index is 21.52 " kg/m².     Physical Exam  Vitals and nursing note reviewed.   Constitutional:       General: He is not in acute distress.     Appearance: He is ill-appearing.      Comments: Disheveled, unshaven   Eyes:      Extraocular Movements: Extraocular movements intact.      Pupils: Pupils are equal, round, and reactive to light.   Cardiovascular:      Rate and Rhythm: Normal rate and regular rhythm.      Heart sounds: No murmur heard.  Pulmonary:      Effort: Pulmonary effort is normal. No respiratory distress.      Breath sounds: Normal breath sounds. No wheezing or rhonchi.   Musculoskeletal:      Cervical back: Normal range of motion and neck supple. No rigidity.   Neurological:      Mental Status: He is alert.      Cranial Nerves: No cranial nerve deficit (intact and tested).      Comments: AAOx1, but patient was unable to remain on topic long enough to answer questions about location or name (he kept repeating his birthdate when asked for the month, year, name, or location, although he eventually was able to state the correct month/year)   Psychiatric:      Comments: Clearly having psychosis w/ perseverating behavior, unable to redirect fully; no shouting or agitation; poor eye contact; not responding to internal stimuli              CRANIAL NERVES     CN III, IV, VI   Pupils are equal, round, and reactive to light.       Significant Labs: All pertinent labs within the past 24 hours have been reviewed.  BMP:   Recent Labs   Lab 08/26/24 1946         K 3.7      CO2 25   BUN 27*   CREATININE 1.1   CALCIUM 10.0     CBC:   Recent Labs   Lab 08/26/24 1946   WBC 4.27   HGB 14.1   HCT 42.5          Significant Imaging: I have reviewed all pertinent imaging results/findings within the past 24 hours.  CXR: I have reviewed all pertinent results/findings within the past 24 hours and my personal findings are:  clear  Assessment/Plan:   39M p/w likely underlying mood or personality dx p/w acute  psychosis    * Shared psychotic disorder  Acute, has reportedly not been a chronic or recurring issue for patient, but it seems very temporally associated with his abnormally quantity of gummies ingested on 8/23/24. I suspect that he has an underlying, under-recognized and non-medicated mood disorder (such as schizoaffective) or personality (schizotypal) that has been exacerbated/unmasked by his recent substance exposure and proximity of that exposure to his interactions with his caring family. Since patient does habitually require a sleep aid, will give trazodone 100mg tonight, but otherwise avoid Rx which will over-sedate for tomorrow's eval. Get Neuro and Psych consults in AM, per family request, since this is a relatively new condition and change. Currently no e/o infxn; TSH is elevated but irrelevant in this situation, as hypothyroid would not account for his sx. Check RPR/HIV/B12/Folate. D/w Dr. 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        VTE Risk Mitigation (From admission, onward)           Ordered     IP VTE LOW RISK PATIENT  Once         08/26/24 2320                     Fully expect that he will require > 2 midnights of reasonable hospital care for further dx, consults tomorrow, and then possible ongoing acute psychosis care. Encounter above was performed on 8/26/24.               MARA DAVIS MD  Department of Hospital Medicine  [facility] - Emergency Dept

## 2024-08-27 NOTE — ED NOTES
Pt's dad in unit to see pt. Explained pt is sleeping. Requesting not to disturb pt for rest. May visit when pt awakens..

## 2024-08-27 NOTE — ED NOTES
Pt states need to void. Urinal given. Pt dad insist pt go to restroom. Security called. Pt escorted to restroom.

## 2024-08-27 NOTE — CONSULTS
"  [facility]   DEPARTMENT OF PSYCHIATRY     IDENTIFIERS & DEMOGRAPHICS:     SERVICE: Telepsychiatry  ENCOUNTER: initial    TELEPSYCHIATRY (AUDIOVISUAL): Each patient who is provided psychiatric services via telehealth is: (1) informed of the relationship between the psychiatric provider and patient, as well as the respective role of any other health care staff/providers with respect to management of the patient; and (2) notified that he or she may decline to receive psychiatric services by telehealth and may withdraw from such care at any time.  Risks of telehealth include the potential for security breaches (HIPPA compliant platforms notwithstanding) and technological failure, as well as the limitations to physical examination inherent to the modality. The patient was agreeable to the use of telehealth services.    START TIME: 8/26/2024 10:30 PM  STOP TIME: 8/26/2024 11:20 PM    -- PATIENT IDENTIFIERS: Fidencio Torres  [id number]  1985  39 y.o.  male  -- REQUESTING PROVIDER: Darlene Borrego MD *  -- LOCATION OF PATIENT: ED    -- PRESENT WITH PATIENT DURING EVALUATION: father.  -- SOURCES OF INFORMATION: PATIENT, EHR/chart, provider(s) (father)  -- LOCATION OF ENCOUNTER PROVIDER: [place], [place]    -- ENCOUNTER PROVIDER: Immanuel Weston MD        PRESENTATION:     OVERVIEW OF THE HPI:    40yo male with history of developmental delay and oppositional defiant disorder, cannabis use, presents with new onset altered mental status status post ingestion of cannabis gummies.    SUBJECTIVE/CURRENT FINDINGS:    Per Patient:  - feeling "in the spot"  - doesn't know what that means  - it's a good thing - "I like being in the spot"  - feeling this way for awhile  - no suicidal ideation or homicidal ideation     Per Chart Review:    Pt brought in by family for eval d/t pt being altered. He took unknown amount of delta 9 gummies. he also stated to family he was suicidal PTA, but denies now.     39-year-old " "male with a history of testicular cancer, oppositional defiant disorder, ADHD presents to the emergency room for abrupt onset of perseveration and confusion that started around noon today.  Patient took 4 delta gummies last night but none today.  He vapes but does not smoke or do any drugs or drink alcohol.  Family states that he was perseverating today for hours repeating that he wanted to go to the spot.  This stopped and then he began to perseverate using his sister's phone.  She would give it to him then he would ask for back and so the pattern would repeat.  This is very abnormal for the patient.  They state typically he is able to do activities of basic living but he does have a developmental delay and possibly is on the autism spectrum according to the sister who is a psychiatric nurse.  Patient denies any complaints.     Sister also reports patient has made multiple suicidal statements lately and has been depressed.     The history is provided by the patient and a relative. The history is limited by the condition of the patient.     Per Collateral:    Per Father:  - recently has been taking cannabis gummies  - hypersomnia  - altered mental status started at noon  - behavior currently exhibited hasn't been seen since age 7y  - perseverative, repeating words  - when asked about suicidal statements - states they are rants on facebook   - will state I understand why people commit suicide but not that he is suicidal  - last such statement was two weeks ago - since coming home from girlfriend's in [place]  - father not concerned about safety    REVIEW OF SYSTEMS:    >> SOURCES: patient     Y   Sleep Disturbance/Disruption  +insomnia    'just a tad bit" - can't say how many hours   N   Appetite/Weight Change  no - "I'm not dead"   N   Alterations in Energy Level   Y   Impaired Focus/Concentration  +easy distractibility     Y   Depressive Symptomatology  +hopelessness (only because " "on the state's money)  no depressed mood, no worthlessness (have had those but currently ok)    no but testicular cancer is ok - gone for awhile   N   Excessive Anxiety/Worry   N   Dysregulated Mood/Behavior  no irritability, no anger/temper     N   Manic Symptomatology   Y   Psychosis  +hallucinations, +visual hallucinations (just people)  Paranoid ideation: "if they were homeless like my mom thinks".    Regarding the current presentation, no other significant issues or complaints are voiced or known at this time.       ADD-ON PSYCHOTHERAPY:     ADD-ON THERAPY     HISTORY:     >> SOURCES: patient, collateral       PSYCH  SUBSTANCE  FAMILY  SOCIAL  MEDICAL     Y   Previous/Pre-Existing Psychiatric Diagnoses  Oppositional Defiant Disorder   N   Past Psychotropic Trials   N   Current Psychiatric Provider   N   Hx of Outpatient Psychiatric Treatment   N   Hx of Psychiatric Hospitalization   Y   Hx of Suicidal Ideation/Threats   N   Hx of Suicide Attempts/Gestures   N   Hx of Homicidal Ideation/Threats   N   Hx of Homicidal Behavior   Y   Hx of Non-Suicidal Self-Injurious Behavior  scratches arms   N   Hx of Perpetrated Violence   N   Documented Hx of Malingering     N   Recent Alcohol Consumption   N   Hx of Nicotine Use   N   Hx of Alcohol Misuse/Abuse   Y   Hx of Illicit Drug Misuse/Abuse   +   Drug Experimentation/Usage  +cannabis (daily)       N   Family Psychiatric History   +   Family Hx of Suicide  no      N   Hx of Trauma   N   Hx of Abuse     Y   Developmental Delay/Disability  Mild Intellectual Disability   Y   GED/High School Diploma   N   Currently Employed   Y   Currently on Disability   Y   Functions Independently  needs reminders   Y   Domiciled  lives between father in [place], mother in [place], and girlfriend in [place]   Y   Intact Support " "System   Y   Currently in a Relationship   Y   Ever    - for 15y   N   Children/Dependents   N   Zoroastrian/Spiritual   N   Hx of  Service     N   Ever Charged/Convicted   N   Current Probation/[place]/Diversion   N   Hx of Incarceration     N   Hx of Seizures   N   Hx of Head Trauma     Y   Medical Hx & Diagnoses   Y   Allergies   +   Key Diagnoses  +cancer (testicular - in remission)      >> SCHEDULED AND PRN MEDS: reviewed/reconciled  see MEDCARD      Allergies:  Iodine and iodide containing products     EXAMINATION:     VITALS:  /64   Pulse 70   Temp 98.2 °F (36.8 °C) (Oral)   Resp 16   Ht 5' 10" (1.778 m)   Wt 68 kg (150 lb)   SpO2 96%   BMI 21.52 kg/m²     MENTAL STATUS EXAMINATION:  Appearance: appears stated age  appropriately dressed, adequately groomed    Behavior & Attitude: odd, poorly related, minimally participative, under adequate behavioral control    Movements & Motor Activity: +psychomotor agitation  no psychomotor retardation    Speech & Language: decreased quantity  non-spontaneous, non-reciprocal    Mood: "in the spot"  Affect: reactive, inappropriate given the situation/context    Thought Process & Associations: tangential, perseverative, +loosening of associations  disorganized, illogical, irrelevant    Thought Content & Perceptions: +hallucinations, +visual hallucinations    Sensorium: awake  clouded    Orientation: grossly intact    Recent & Remote Memory: impaired (recent), impaired (remote)    Attention & Concentration: inattentive to conversation, easily distracted  impaired    Fund of Knowledge: +intellectual disability  impaired    Insight: impaired    Judgment: impaired            RISK & REGULATORY:      RISK PARAMETERS (current to the encounter/episode  NOT inclusive of past history):     N   Suicidal Ideation/Threats   N   Suicide Attempts/Gestures   N   " Homicidal Ideation/Threats   N   Homicidal Behavior   N   Non-Suicidal Self-Injurious Behavior   N   Perpetrated Violence     FIREARMS & WEAPONS:     N   Ready Access to Firearms   N   Prohibition of Firearms Indicated  nonetheless, removal/restriction was ADVISED, as this would further mitigate risk   Y   Gun Safety Counseled  e.g., proper storage, inherent risk     SAFETY SCREENINGS:    -- RISK FACTORS: IDENTIFIED     - SPECIFIC MODIFIABLE FACTORS IDENTIFIED: psychotic/irrational, substance abuse     - SPECIFIC NON-MODIFIABLE FACTORS IDENTIFIED: male sex     - AWARENESS  MITIGATION: ADDRESSED       *The patient has acknowledged and exhibited an understanding of identified modifiable and/or non-modifiable risk factors. Furthermore, steps have been taken to mitigate or address these risk factors.    -- CONTRACTS FOR SAFETY: YES  -- FUTURE ORIENTED: YES    -- CAREGIVER(S)     - SUPPORTIVE & APPROPRIATELY INVOLVED: YES     - MONITORING: AVAILABLE/AGREEABLE    -- RISK MITIGATION & PREVENTION:      - INTERVENTIONS: 988/911/ED (info), advice/counseling, harm reduction, safety plan, standardization, tx of pathology     REGULATORY:    -- CARE COORDINATION  the case was discussed and care was coordinated with member(s) of the treatment team.      INFORMED CONSENT & SHARED DECISION MAKING are the hallmark and bedrock of good clinical care, and as such have been employed and obtained, respectively, to the degree possible.  Discussed, to the extent possible, diagnosis, risks and benefits of proposed treatment (e.g., medication, therapy) vs alternative treatments vs no treatment, potential side effects of these treatments, and the inherent unpredictability of treatment.  Resources have been provided via the patient instructions in the AVS to supplement, augment, and reinforce discussions, counseling, and/or interventions.       - ABILITY TO UNDERSTAND, PARTICIPATE & ENGAGE: minimal     -  RELIABILITY/ACCURACY: the patient is deemed to be a poor historian      WARNINGS & PRECAUTIONS:  >> In cases of emergencies (e.g. SI/HI resulting in danger to self or others, functioning deteriorating to the level of grave disability), call 911 or [phone], or present to the emergency department for immediate assistance.    >> Individuals should not operate a motor vehicle or heavy machinery if effects of medications or underlying symptoms/condition impair the ability to do so safely.    >> FULLY comply with ANY/ALL medication as prescribed/instructed and report ANY/ALL suspected adverse effects to appropriate health care providers.       ASSESSMENT & PLAN:     DIAGNOSES & PROBLEMS:       1.  Substance Induced Psychotic Disorder    2.  Cannabis Use Disorder    3.  Intellectual Disability    PSYCHOTROPIC REGIMEN:     None    -- ASSESSMENT (synthesis  analysis):     Patient presents with altered mental status, substance induced psychosis.  Presumed etiology is secondary to the gummies - unclear if there was anything besides THC present in them.  Father is not concerned about suicidal ideation - apparently he was making provocative statements when in [place] on [employer], but this was several weeks ago.     - GLOBAL FUNCTIONING: acutely decompensated/deteriorated    -- LEGAL (current status  certification criteria):     > Physician's Emergency Certificate (PEC)     - DANGER TO SELF: no   - DANGER TO OTHERS: no   - GRAVELY DISABLED: yes    -- DISPOSITION  SUMMATION:     With reasonable medical certainty, based on history, chart review, available collateral information, and a present-state examination:    - currently meets criteria for psychiatric hospitalization - once medically cleared, seek admission   - PEC is INDICATED - enact if not yet in place, and ensure safety measures and elopement precautions, per unit protocol    -- PLAN (goals  recommentations):     Spoke with ED physician Dr. Borrego.  Plan is to  admit to hospital medicine over night - if mental status does not resolve, would then need inpt psych placement.  He received benadryl in the ED, if becomes agitated tonight consider low dose of a neuroleptic.  If psychosis remains tomorrow, then would initiate standing neuroleptic at that point.    MEDICAL DECISION MAKING:    - RISK: HIGH     - COMPLEXITY: HIGH   - DATA: +review of prior external note(s) from each unique source, +review of the result(s) of each unique test, +assessment requiring an independent historian, +discussion of management or test interpretation with an external source   - DIAGNOSTICS: a diagnostic psychiatric evaluation was performed and responsiveness to treatment was assessed  ability/capacity to respond to treatment: minimal   > LEVEL: HIGH    CHART REVIEW: available documentation has been reviewed, and pertinent elements of the chart have been incorporated into this evaluation where appropriate.       DIAGNOSTIC TESTING:      Glu 103  8/26/2024  Li *   *  TSH 7.226 (H)  8/26/2024    HgA1c 5.5  11/23/2021  VPA *   *   FT4 0.84  8/26/2024    Na 138  8/26/2024  CLZ *   *  WBC 4.27  8/26/2024    Cr 1.1  8/26/2024  ANC 2.6; 60.8;   8/26/2024   Hgb 14.1  8/26/2024     BUN 27 (H)  8/26/2024  Trop I *   *  HCT 42.5  8/26/2024     GFR >60.0  8/26/2024   CPK *   *    8/26/2024     Alb 4.8  8/26/2024   PRL *   *  B12 *   *     T Bili 0.5  8/26/2024  Chol 194  11/23/2021  B9 *   *    ALP 48 (L)  8/26/2024  TGs 238 (H)  11/23/2021  B1 *   *    AST 19  8/26/2024  HDL 36 (L)  11/23/2021  Vit D *   *     ALT 19  8/26/2024   (H)  11/23/2021  HIV *   *     INR *   *  Kera *   *   Hep C *   *    GGT *   *  Lip *   *  RPR *   *    MCV 82  8/26/2024   NH4 26  8/26/2024  UPT *   *      PETH *   *  THC Presumptive Positive (A)  8/26/2024    ETOH <10  8/26/2024  TRAICE Negative  8/26/2024    EtG *   *  AMP Negative  8/26/2024    ALC *   *  OPI  Negative  8/26/2024    BZO Negative  8/26/2024  MTD *   *     BAR Negative  8/26/2024  BUP *   *    PCP Negative  8/26/2024  FEN *   *     No results found for this or any previous visit.     KEY & LINKS:        Y  = yes/endorses     N  = no/denies     U  = unknown/unable to assess    ADHD   AIMS   AUDIT   AUDIT-C   C-SSRS (Screen)   C-SSRS (Short)   C-SSRS (Full)   DAST   DAST-10   LAKIA-7   MoCA   PCL-5   PHQ-9   VIVIAN   YMRS     Inpatient consult to Telemedicine - Psych  Consult performed by: Immanuel Weston MD  Consult ordered by: Darlene Borrego MD        [facility] EMERGENCY DEPARTMENT

## 2024-08-28 PROBLEM — Z13.9 ENCOUNTER FOR MEDICAL SCREENING EXAMINATION: Status: ACTIVE | Noted: 2024-08-28

## 2024-08-28 PROBLEM — F84.0 AUTISM SPECTRUM DISORDER: Status: ACTIVE | Noted: 2024-08-28

## 2024-08-28 PROBLEM — E03.8 SUBCLINICAL HYPOTHYROIDISM: Status: ACTIVE | Noted: 2024-08-28

## 2024-08-28 PROBLEM — R46.2 BIZARRE BEHAVIOR: Status: ACTIVE | Noted: 2024-08-28

## 2024-08-28 LAB — RPR SER QL: NORMAL

## 2024-08-29 PROBLEM — E44.1 MILD MALNUTRITION: Status: ACTIVE | Noted: 2024-08-29

## 2024-09-10 ENCOUNTER — OFFICE VISIT (OUTPATIENT)
Dept: FAMILY MEDICINE | Facility: CLINIC | Age: 39
End: 2024-09-10
Payer: MEDICARE

## 2024-09-10 VITALS
RESPIRATION RATE: 18 BRPM | HEIGHT: 70 IN | OXYGEN SATURATION: 99 % | SYSTOLIC BLOOD PRESSURE: 166 MMHG | WEIGHT: 157 LBS | HEART RATE: 60 BPM | DIASTOLIC BLOOD PRESSURE: 104 MMHG | BODY MASS INDEX: 22.48 KG/M2

## 2024-09-10 DIAGNOSIS — R46.2 BIZARRE BEHAVIOR: ICD-10-CM

## 2024-09-10 DIAGNOSIS — E44.1 MILD MALNUTRITION: ICD-10-CM

## 2024-09-10 DIAGNOSIS — I10 HYPERTENSION, UNSPECIFIED TYPE: Primary | ICD-10-CM

## 2024-09-10 DIAGNOSIS — F84.0 AUTISM SPECTRUM DISORDER: ICD-10-CM

## 2024-09-10 PROCEDURE — 99204 OFFICE O/P NEW MOD 45 MIN: CPT | Mod: S$GLB,,, | Performed by: PHYSICIAN ASSISTANT

## 2024-09-10 RX ORDER — LISINOPRIL 10 MG/1
10 TABLET ORAL DAILY
Qty: 30 TABLET | Refills: 5 | Status: SHIPPED | OUTPATIENT
Start: 2024-09-10 | End: 2024-09-13 | Stop reason: SDUPTHER

## 2024-09-10 NOTE — PROGRESS NOTES
SUBJECTIVE:    Patient ID: Fidencio Torres is a 39 y.o. male.    Chief Complaint: Establish Care (New patient visit// no bottles// no refills needed// pt dad states pt bp been running high at home 177/93// concerned about pt thyroid levels //states pt was recently in hospital for Mental health problems)    This is a 39-year-old male who presents today to establish care. Previous PCP Jona Tim, transitioning over to me.  Recent admission to psychiatric facility.  Discharge summary is pasted below:      Hospital Course:   Patient was admitted to the inpatient psychiatry unit after being medically cleared in the ED for further treatment of psychosis and depression. Chart and labs were reviewed. PRN medications for sleep, anxiety, and agitation. Pt was placed on standard precautions including suicide. Patient was started and titrated as noted on the medications noted on the discharge Medication list attached below. Also see the medications continued from his home regimen that were continued and helped on the stabilization of this patient.      Patient did not require use of Restraints and Seclusion.     The patient's mood, mental status and behavior on the unit are now appropriate and much improved. The treatment team has determined that the patient is no longer a danger to self, danger to others or gravely disabled. It was felt that the patient had reached maximum benefit from his inpatient treatment and was acutely stabilized. Overall, the patient did well on the inpatient psychiatric unit at [facility] and is appropriate for discharge at this time. He was free of complaints, and expressed understanding of his treatment plan and follow up strategies.      Medical/Physical Condition at Discharge:   Patient treated per medical team. Patient was monitored throughout hospitalization with no exacerbating conditions per medical. Patient encouraged to follow up with PCP upon discharge. Patient's  condition stable at time of discharge.      Functional Condition on Discharge:   Walking: Independent  Eating: Independent  ADL: Independent     Major Events:  Patient send to the ED for evaluation: NO     Disposition: discharged to home     Activity: As tolerated     F/U:      Follow-up Information            [facility] Follow up.   Why: [facility] will follow up with patient 24-48 hours upon discharge.  Contact information:  [id number]   [place] [id number]  Phone: [phone]     Presents today with father and doing MUCH better. Back to baseline. ACT team appt scheduled for next Monday. Only concern recently is the elevated pressure at home and reports was present in [facility] as well. Thinks that they were giving him something in the hospital but not documented.     [facility] is a challenge due to the fact that he spends part of the year in [place], [facility] and [facility]. Really wanting to get his health a priority.                             Admission on 08/26/2024, Discharged on 08/27/2024   Component Date Value Ref Range Status    WBC 08/26/2024 4.27  3.90 - 12.70 K/uL Final    RBC 08/26/2024 5.18  4.60 - 6.20 M/uL Final    Hemoglobin 08/26/2024 14.1  14.0 - 18.0 g/dL Final    Hematocrit 08/26/2024 42.5  40.0 - 54.0 % Final    MCV 08/26/2024 82  82 - 98 fL Final    MCH 08/26/2024 27.2  27.0 - 31.0 pg Final    MCHC 08/26/2024 33.2  32.0 - 36.0 g/dL Final    RDW 08/26/2024 13.0  11.5 - 14.5 % Final    Platelets 08/26/2024 289  150 - 450 K/uL Final    MPV 08/26/2024 9.9  9.2 - 12.9 fL Final    Immature Granulocytes 08/26/2024 0.2  0.0 - 0.5 % Final    Gran # (ANC) 08/26/2024 2.6  1.8 - 7.7 K/uL Final    Immature Grans (Abs) 08/26/2024 0.01  0.00 - 0.04 K/uL Final    Lymph # 08/26/2024 1.1  1.0 - 4.8 K/uL Final    Mono # 08/26/2024 0.4  0.3 - 1.0 K/uL Final    Eos # 08/26/2024 0.2  0.0 - 0.5 K/uL Final    Baso # 08/26/2024 0.01  0.00 - 0.20 K/uL Final    nRBC 08/26/2024 0  0 /100 WBC Final     Gran % 08/26/2024 60.8  38.0 - 73.0 % Final    Lymph % 08/26/2024 26.0  18.0 - 48.0 % Final    Mono % 08/26/2024 9.1  4.0 - 15.0 % Final    Eosinophil % 08/26/2024 3.7  0.0 - 8.0 % Final    Basophil % 08/26/2024 0.2  0.0 - 1.9 % Final    Differential Method 08/26/2024 Automated   Final    Sodium 08/26/2024 138  136 - 145 mmol/L Final    Potassium 08/26/2024 3.7  3.5 - 5.1 mmol/L Final    Chloride 08/26/2024 101  95 - 110 mmol/L Final    CO2 08/26/2024 25  23 - 29 mmol/L Final    Glucose 08/26/2024 103  70 - 110 mg/dL Final    BUN 08/26/2024 27 (H)  6 - 20 mg/dL Final    Creatinine 08/26/2024 1.1  0.5 - 1.4 mg/dL Final    Calcium 08/26/2024 10.0  8.7 - 10.5 mg/dL Final    Total Protein 08/26/2024 7.5  6.0 - 8.4 g/dL Final    Albumin 08/26/2024 4.8  3.5 - 5.2 g/dL Final    Total Bilirubin 08/26/2024 0.5  0.1 - 1.0 mg/dL Final    Alkaline Phosphatase 08/26/2024 48 (L)  55 - 135 U/L Final    AST 08/26/2024 19  10 - 40 U/L Final    ALT 08/26/2024 19  10 - 44 U/L Final    eGFR 08/26/2024 >60.0  >60 mL/min/1.73 m^2 Final    Anion Gap 08/26/2024 12  8 - 16 mmol/L Final    TSH 08/26/2024 7.226 (H)  0.340 - 5.600 uIU/mL Final    Specimen UA 08/26/2024 Urine, Clean Catch   Final    Color, UA 08/26/2024 Yellow  Yellow, Straw, Seema Final    Appearance, UA 08/26/2024 Clear  Clear Final    pH, UA 08/26/2024 6.0  5.0 - 8.0 Final    Specific Gravity, UA 08/26/2024 1.030  1.005 - 1.030 Final    Protein, UA 08/26/2024 Negative  Negative Final    Glucose, UA 08/26/2024 Negative  Negative Final    Ketones, UA 08/26/2024 Trace (A)  Negative Final    Bilirubin (UA) 08/26/2024 Negative  Negative Final    Occult Blood UA 08/26/2024 Negative  Negative Final    Nitrite, UA 08/26/2024 Negative  Negative Final    Urobilinogen, UA 08/26/2024 Negative  Negative EU/dL Final    Leukocytes, UA 08/26/2024 Negative  Negative Final    Benzodiazepines 08/26/2024 Negative  Negative Final    Cocaine (Metab.) 08/26/2024 Negative  Negative Final     Opiate Scrn, Ur 08/26/2024 Negative  Negative Final    Barbiturate Screen, Ur 08/26/2024 Negative  Negative Final    Amphetamine Screen, Ur 08/26/2024 Negative  Negative Final    THC 08/26/2024 Presumptive Positive (A)  Negative Final    Phencyclidine 08/26/2024 Negative  Negative Final    Creatinine, Urine 08/26/2024 130.5  23.0 - 375.0 mg/dL Final    Toxicology Information 08/26/2024 SEE COMMENT   Final    Alcohol, Serum 08/26/2024 <10  <10 mg/dL Final    Acetaminophen (Tylenol), Serum 08/26/2024 0.3 (L)  10.0 - 20.0 ug/mL Final    SARS-CoV-2 RNA, Amplification, Qual 08/26/2024 Negative  Negative Final    Salicylate Lvl 08/26/2024 <1.5 (L)  15.0 - 30.0 mg/dL Final    QRS Duration 08/26/2024 138  ms Final    OHS QTC Calculation 08/26/2024 443  ms Final    Ammonia 08/26/2024 26  10 - 50 umol/L Final    Free T4 08/26/2024 0.84  0.71 - 1.51 ng/dL Final    RPR 08/27/2024 Non-reactive   Final    Folate 08/27/2024 9.3  4.0 - 24.0 ng/mL Final    Vitamin B-12 08/27/2024 384  210 - 950 pg/mL Final    HIV Rapid Testing 08/27/2024 Negative  Negative Final       Past Medical History:   Diagnosis Date    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  Tobacco History:  reports that he has never smoked. He has never used smokeless tobacco.  Drug History:  reports current drug use. Drug: Marijuana.    Review of patient's allergies indicates:   Allergen Reactions    Iodine and iodide containing products Anaphylaxis       Current Outpatient Medications:     ARIPiprazole (ABILIFY) 10 MG Tab, Take 1 tablet (10 mg total) by mouth once daily., Disp: 30 tablet, Rfl: 0    mirtazapine (REMERON) 7.5 MG Tab,  "Take 1 tablet (7.5 mg total) by mouth every evening., Disp: 30 tablet, Rfl: 0    rOPINIRole (REQUIP) 0.25 MG tablet, Take 1 tablet (0.25 mg total) by mouth every evening., Disp: 30 tablet, Rfl: 0    venlafaxine (EFFEXOR-XR) 37.5 MG 24 hr capsule, Take 1 capsule (37.5 mg total) by mouth once daily., Disp: 30 capsule, Rfl: 0    lisinopriL 10 MG tablet, Take 1 tablet (10 mg total) by mouth once daily., Disp: 30 tablet, Rfl: 5    Review of Systems   Constitutional:  Negative for activity change and unexpected weight change.   HENT:  Negative for hearing loss, rhinorrhea and trouble swallowing.    Eyes:  Negative for discharge and visual disturbance.   Respiratory:  Negative for chest tightness and wheezing.    Cardiovascular:  Negative for chest pain and palpitations.   Gastrointestinal:  Negative for blood in stool, constipation, diarrhea and vomiting.   Endocrine: Negative for polydipsia and polyuria.   Genitourinary:  Negative for difficulty urinating, hematuria and urgency.   Musculoskeletal:  Negative for arthralgias, joint swelling and neck pain.   Neurological:  Negative for weakness and headaches.   Psychiatric/Behavioral:  Positive for dysphoric mood. Negative for confusion.           Objective:      Vitals:    09/10/24 1438 09/10/24 1440   BP: (!) 156/102 (!) 166/104   Pulse: 60    Resp: 18    SpO2: 99%    Weight: 71.2 kg (157 lb)    Height: 5' 10" (1.778 m)      Physical Exam  Constitutional:       General: He is not in acute distress.     Appearance: He is well-developed.   HENT:      Head: Normocephalic and atraumatic.   Eyes:      Pupils: Pupils are equal, round, and reactive to light.   Neck:      Thyroid: No thyromegaly.   Cardiovascular:      Rate and Rhythm: Normal rate and regular rhythm.      Heart sounds: Normal heart sounds.   Pulmonary:      Effort: Pulmonary effort is normal.      Breath sounds: Normal breath sounds.   Abdominal:      General: Bowel sounds are normal. There is no distension.      " Palpations: Abdomen is soft.      Tenderness: There is no abdominal tenderness.   Musculoskeletal:         General: Normal range of motion.      Cervical back: Normal range of motion and neck supple.   Skin:     General: Skin is warm and dry.      Findings: No erythema or rash.   Neurological:      Mental Status: He is alert and oriented to person, place, and time.      Cranial Nerves: No cranial nerve deficit.           Assessment:       1. Hypertension, unspecified type    2. Autism spectrum disorder    3. Bizarre behavior    4. Mild malnutrition         Plan:       Hypertension, unspecified type  Comments:  BP is running a bit high and he is agreeable to starting medication now. will upload log for me in the next few weeks.  Orders:  -     lisinopriL 10 MG tablet; Take 1 tablet (10 mg total) by mouth once daily.  Dispense: 30 tablet; Refill: 5    Autism spectrum disorder  Comments:  undiagnosed. long term condition.    Bizarre behavior  Comments:  seems to be doing MUCH better and back to baseline. discussed the need to avoid THC in the future and will continue on meds from ACT team. maintain meds now    Mild malnutrition  Comments:  Starting to put a bit of weight on. discussed protein and good food groups to maintain.      Follow up in about 6 weeks (around 10/22/2024).        9/10/2024 Caden Vazquez PA-C

## 2024-09-13 ENCOUNTER — PATIENT MESSAGE (OUTPATIENT)
Dept: FAMILY MEDICINE | Facility: CLINIC | Age: 39
End: 2024-09-13
Payer: MEDICARE

## 2024-09-13 DIAGNOSIS — I10 HYPERTENSION, UNSPECIFIED TYPE: ICD-10-CM

## 2024-09-13 RX ORDER — LISINOPRIL 10 MG/1
10 TABLET ORAL DAILY
Qty: 30 TABLET | Refills: 5 | Status: SHIPPED | OUTPATIENT
Start: 2024-09-13 | End: 2025-03-12

## 2024-09-13 NOTE — TELEPHONE ENCOUNTER
----- Message from Yanet Roman sent at 9/13/2024  9:36 AM CDT -----  Pt high blood pressure medication was sent to [facility]. They are still closed and would like it sent to   [facility]   [phone]

## 2024-09-18 ENCOUNTER — PATIENT MESSAGE (OUTPATIENT)
Dept: FAMILY MEDICINE | Facility: CLINIC | Age: 39
End: 2024-09-18
Payer: MEDICARE

## 2024-09-18 DIAGNOSIS — F24 SHARED PSYCHOTIC DISORDER: Primary | ICD-10-CM

## 2024-09-18 DIAGNOSIS — I10 HYPERTENSION, UNSPECIFIED TYPE: ICD-10-CM

## 2024-09-18 RX ORDER — MIRTAZAPINE 7.5 MG/1
7.5 TABLET, FILM COATED ORAL NIGHTLY
Qty: 30 TABLET | Refills: 2 | Status: SHIPPED | OUTPATIENT
Start: 2024-09-18

## 2024-09-18 RX ORDER — ROPINIROLE 0.25 MG/1
0.25 TABLET, FILM COATED ORAL NIGHTLY
Qty: 30 TABLET | Refills: 2 | Status: SHIPPED | OUTPATIENT
Start: 2024-09-18

## 2024-09-18 RX ORDER — ARIPIPRAZOLE 10 MG/1
10 TABLET ORAL DAILY
Qty: 30 TABLET | Refills: 2 | Status: SHIPPED | OUTPATIENT
Start: 2024-09-18

## 2024-09-18 RX ORDER — VENLAFAXINE HYDROCHLORIDE 37.5 MG/1
37.5 CAPSULE, EXTENDED RELEASE ORAL DAILY
Qty: 30 CAPSULE | Refills: 2 | Status: SHIPPED | OUTPATIENT
Start: 2024-09-18

## 2024-09-18 RX ORDER — LISINOPRIL 20 MG/1
20 TABLET ORAL DAILY
Qty: 30 TABLET | Refills: 5 | Status: SHIPPED | OUTPATIENT
Start: 2024-09-18 | End: 2025-03-17

## 2024-10-22 ENCOUNTER — OFFICE VISIT (OUTPATIENT)
Dept: FAMILY MEDICINE | Facility: CLINIC | Age: 39
End: 2024-10-22
Payer: MEDICARE

## 2024-10-22 VITALS
WEIGHT: 175.38 LBS | HEIGHT: 70 IN | HEART RATE: 79 BPM | BODY MASS INDEX: 25.11 KG/M2 | OXYGEN SATURATION: 99 % | SYSTOLIC BLOOD PRESSURE: 102 MMHG | DIASTOLIC BLOOD PRESSURE: 68 MMHG

## 2024-10-22 DIAGNOSIS — I10 HYPERTENSION, UNSPECIFIED TYPE: ICD-10-CM

## 2024-10-22 DIAGNOSIS — R46.2 BIZARRE BEHAVIOR: Primary | ICD-10-CM

## 2024-10-22 DIAGNOSIS — G47.00 INSOMNIA, UNSPECIFIED TYPE: ICD-10-CM

## 2024-10-22 DIAGNOSIS — F24 SHARED PSYCHOTIC DISORDER: ICD-10-CM

## 2024-10-22 DIAGNOSIS — K21.9 GASTROESOPHAGEAL REFLUX DISEASE, UNSPECIFIED WHETHER ESOPHAGITIS PRESENT: ICD-10-CM

## 2024-10-22 PROCEDURE — 99214 OFFICE O/P EST MOD 30 MIN: CPT | Mod: S$GLB,,, | Performed by: PHYSICIAN ASSISTANT

## 2024-10-22 RX ORDER — PHENOL/SODIUM PHENOLATE
20 AEROSOL, SPRAY (ML) MUCOUS MEMBRANE DAILY
Qty: 90 EACH | Refills: 1 | Status: SHIPPED | OUTPATIENT
Start: 2024-10-22 | End: 2025-10-22

## 2024-10-22 RX ORDER — MIRTAZAPINE 15 MG/1
15 TABLET, FILM COATED ORAL NIGHTLY
Qty: 30 TABLET | Refills: 5 | Status: SHIPPED | OUTPATIENT
Start: 2024-10-22 | End: 2025-04-20

## 2024-10-22 NOTE — PROGRESS NOTES
SUBJECTIVE:    Patient ID: Fidencio Torres is a 39 y.o. male.    Chief Complaint: Follow-up (Follow up/ difficulty sleeping, broken sleep cycle, awakens about three times at night/ after eating will start coughing after every meal/ BP medication working well, average 128/82 //mp)    HPI:  Patient is a 39 year old male who presents to the clinic today for 2 month follow up. Patient notes difficulty falling and staying asleep. He wakes up around 3 times a night This has been going on for a couple months. He tried melatonin and THC gummies that helped with sleep. He never tried melatonin alone. THC gummies caused a psychotic breakdown. He never tried the melatonin alone. He tried trazdone during his psychotic break and father states it did not help at all. He is currently on mirtazepine and that does not help at all.he used to be on mirtazipine 15mg in the [facility] facility and that helped with sleep. Currently he attends [facility] [employer] Making strides. Will be out in [place] for Thanksgiving.    He was started on lisinopril 10mg BID during last visit. His BP today is 102/68. He keeps a log at home and it averages at 120s/80s. His BP today is 102/68.     He notes coughing after dedrick meal. This only occurs occasionally. Father states he's noticed it happening more often. Father notes he has acid reflux and he used to cough like that after eating. Patient notes occasional heart burn feeling.       Follow-up  Pertinent negatives include no arthralgias, chest pain, headaches, joint swelling, neck pain, vomiting or weakness.         Admission on 08/26/2024, Discharged on 08/27/2024   Component Date Value Ref Range Status    WBC 08/26/2024 4.27  3.90 - 12.70 K/uL Final    RBC 08/26/2024 5.18  4.60 - 6.20 M/uL Final    Hemoglobin 08/26/2024 14.1  14.0 - 18.0 g/dL Final    Hematocrit 08/26/2024 42.5  40.0 - 54.0 % Final    MCV 08/26/2024 82  82 - 98 fL Final    MCH 08/26/2024 27.2  27.0 - 31.0 pg Final    MCHC  08/26/2024 33.2  32.0 - 36.0 g/dL Final    RDW 08/26/2024 13.0  11.5 - 14.5 % Final    Platelets 08/26/2024 289  150 - 450 K/uL Final    MPV 08/26/2024 9.9  9.2 - 12.9 fL Final    Immature Granulocytes 08/26/2024 0.2  0.0 - 0.5 % Final    Gran # (ANC) 08/26/2024 2.6  1.8 - 7.7 K/uL Final    Immature Grans (Abs) 08/26/2024 0.01  0.00 - 0.04 K/uL Final    Lymph # 08/26/2024 1.1  1.0 - 4.8 K/uL Final    Mono # 08/26/2024 0.4  0.3 - 1.0 K/uL Final    Eos # 08/26/2024 0.2  0.0 - 0.5 K/uL Final    Baso # 08/26/2024 0.01  0.00 - 0.20 K/uL Final    nRBC 08/26/2024 0  0 /100 WBC Final    Gran % 08/26/2024 60.8  38.0 - 73.0 % Final    Lymph % 08/26/2024 26.0  18.0 - 48.0 % Final    Mono % 08/26/2024 9.1  4.0 - 15.0 % Final    Eosinophil % 08/26/2024 3.7  0.0 - 8.0 % Final    Basophil % 08/26/2024 0.2  0.0 - 1.9 % Final    Differential Method 08/26/2024 Automated   Final    Sodium 08/26/2024 138  136 - 145 mmol/L Final    Potassium 08/26/2024 3.7  3.5 - 5.1 mmol/L Final    Chloride 08/26/2024 101  95 - 110 mmol/L Final    CO2 08/26/2024 25  23 - 29 mmol/L Final    Glucose 08/26/2024 103  70 - 110 mg/dL Final    BUN 08/26/2024 27 (H)  6 - 20 mg/dL Final    Creatinine 08/26/2024 1.1  0.5 - 1.4 mg/dL Final    Calcium 08/26/2024 10.0  8.7 - 10.5 mg/dL Final    Total Protein 08/26/2024 7.5  6.0 - 8.4 g/dL Final    Albumin 08/26/2024 4.8  3.5 - 5.2 g/dL Final    Total Bilirubin 08/26/2024 0.5  0.1 - 1.0 mg/dL Final    Alkaline Phosphatase 08/26/2024 48 (L)  55 - 135 U/L Final    AST 08/26/2024 19  10 - 40 U/L Final    ALT 08/26/2024 19  10 - 44 U/L Final    eGFR 08/26/2024 >60.0  >60 mL/min/1.73 m^2 Final    Anion Gap 08/26/2024 12  8 - 16 mmol/L Final    TSH 08/26/2024 7.226 (H)  0.340 - 5.600 uIU/mL Final    Specimen UA 08/26/2024 Urine, Clean Catch   Final    Color, UA 08/26/2024 Yellow  Yellow, Straw, Seema Final    Appearance, UA 08/26/2024 Clear  Clear Final    pH, UA 08/26/2024 6.0  5.0 - 8.0 Final    Specific Gravity, UA  08/26/2024 1.030  1.005 - 1.030 Final    Protein, UA 08/26/2024 Negative  Negative Final    Glucose, UA 08/26/2024 Negative  Negative Final    Ketones, UA 08/26/2024 Trace (A)  Negative Final    Bilirubin (UA) 08/26/2024 Negative  Negative Final    Occult Blood UA 08/26/2024 Negative  Negative Final    Nitrite, UA 08/26/2024 Negative  Negative Final    Urobilinogen, UA 08/26/2024 Negative  Negative EU/dL Final    Leukocytes, UA 08/26/2024 Negative  Negative Final    Benzodiazepines 08/26/2024 Negative  Negative Final    Cocaine (Metab.) 08/26/2024 Negative  Negative Final    Opiate Scrn, Ur 08/26/2024 Negative  Negative Final    Barbiturate Screen, Ur 08/26/2024 Negative  Negative Final    Amphetamine Screen, Ur 08/26/2024 Negative  Negative Final    THC 08/26/2024 Presumptive Positive (A)  Negative Final    Phencyclidine 08/26/2024 Negative  Negative Final    Creatinine, Urine 08/26/2024 130.5  23.0 - 375.0 mg/dL Final    Toxicology Information 08/26/2024 SEE COMMENT   Final    Alcohol, Serum 08/26/2024 <10  <10 mg/dL Final    Acetaminophen (Tylenol), Serum 08/26/2024 0.3 (L)  10.0 - 20.0 ug/mL Final    SARS-CoV-2 RNA, Amplification, Qual 08/26/2024 Negative  Negative Final    Salicylate Lvl 08/26/2024 <1.5 (L)  15.0 - 30.0 mg/dL Final    QRS Duration 08/26/2024 138  ms Final    OHS QTC Calculation 08/26/2024 443  ms Final    Ammonia 08/26/2024 26  10 - 50 umol/L Final    Free T4 08/26/2024 0.84  0.71 - 1.51 ng/dL Final    RPR 08/27/2024 Non-reactive   Final    Folate 08/27/2024 9.3  4.0 - 24.0 ng/mL Final    Vitamin B-12 08/27/2024 384  210 - 950 pg/mL Final    HIV Rapid Testing 08/27/2024 Negative  Negative Final       Past Medical History:   Diagnosis Date    Cancer 2009    Testicular     Mental health problem      Past Surgical History:   Procedure Laterality Date    testical removal Right 2009     Family History   Problem Relation Name Age of Onset    No Known Problems Mother      No Known Problems Father       No Known Problems Sister         Marital Status:   Alcohol History:  reports that he does not currently use alcohol.  Tobacco History:  reports that he has never smoked. He has never used smokeless tobacco.  Drug History:  reports current drug use. Drug: Marijuana.    Review of patient's allergies indicates:   Allergen Reactions    Iodine and iodide containing products Anaphylaxis       Current Outpatient Medications:     ARIPiprazole (ABILIFY) 10 MG Tab, Take 1 tablet (10 mg total) by mouth once daily., Disp: 30 tablet, Rfl: 2    lisinopriL (PRINIVIL,ZESTRIL) 20 MG tablet, Take 1 tablet (20 mg total) by mouth once daily., Disp: 30 tablet, Rfl: 5    rOPINIRole (REQUIP) 0.25 MG tablet, Take 1 tablet (0.25 mg total) by mouth every evening., Disp: 30 tablet, Rfl: 2    venlafaxine (EFFEXOR-XR) 37.5 MG 24 hr capsule, Take 1 capsule (37.5 mg total) by mouth once daily., Disp: 30 capsule, Rfl: 2    mirtazapine (REMERON) 15 MG tablet, Take 1 tablet (15 mg total) by mouth every evening., Disp: 30 tablet, Rfl: 5    omeprazole 20 mg TbEC, Take 20 mg by mouth once daily., Disp: 90 each, Rfl: 1    Review of Systems   Constitutional:  Negative for activity change and unexpected weight change.   HENT:  Negative for hearing loss, rhinorrhea and trouble swallowing.    Eyes:  Positive for visual disturbance. Negative for discharge.   Respiratory:  Negative for chest tightness and wheezing.    Cardiovascular:  Negative for chest pain and palpitations.   Gastrointestinal:  Negative for blood in stool, constipation, diarrhea and vomiting.   Endocrine: Negative for polydipsia and polyuria.   Genitourinary:  Negative for difficulty urinating, hematuria and urgency.   Musculoskeletal:  Negative for arthralgias, joint swelling and neck pain.   Neurological:  Negative for weakness and headaches.   Psychiatric/Behavioral:  Negative for confusion and dysphoric mood.           Objective:      Vitals:    10/22/24 1151   BP: 102/68  "  Pulse: 79   SpO2: 99%   Weight: 79.6 kg (175 lb 6.4 oz)   Height: 5' 10" (1.778 m)     Physical Exam  Constitutional:       General: He is not in acute distress.     Appearance: Normal appearance. He is not ill-appearing.   HENT:      Head: Normocephalic and atraumatic.      Nose: Nose normal. No congestion or rhinorrhea.      Mouth/Throat:      Pharynx: Oropharynx is clear. No oropharyngeal exudate or posterior oropharyngeal erythema.   Eyes:      General:         Right eye: No discharge.         Left eye: No discharge.      Conjunctiva/sclera: Conjunctivae normal.   Cardiovascular:      Rate and Rhythm: Normal rate and regular rhythm.      Pulses: Normal pulses.   Pulmonary:      Effort: Pulmonary effort is normal. No respiratory distress.      Breath sounds: Normal breath sounds. No wheezing.   Skin:     General: Skin is warm and dry.      Coloration: Skin is not jaundiced or pale.   Neurological:      Mental Status: He is alert and oriented to person, place, and time.   Psychiatric:         Mood and Affect: Mood normal.           Assessment:       1. Bizarre behavior    2. Insomnia, unspecified type    3. Hypertension, unspecified type    4. Gastroesophageal reflux disease, unspecified whether esophagitis present    5. Shared psychotic disorder         Plan:       Bizarre behavior  Comments:  mood has been stable; continue   Abilify 10mg  Mirtazipine 7.5  Effexor 37.5  Requip .25mg    Insomnia, unspecified type  Comments:  hard time falling and staying asleep. tried THC gummies that resulted in breakdown. on mirtazipine 7.5. will increase to 15mg  Orders:  -     mirtazapine (REMERON) 15 MG tablet; Take 1 tablet (15 mg total) by mouth every evening.  Dispense: 30 tablet; Refill: 5    Hypertension, unspecified type  Comments:  BP at goal today. avergaing 120/80s at home. continue lisinopril 10mg BID    Gastroesophageal reflux disease, unspecified whether esophagitis present  Comments:  discussed lifestyle " modifications. add omeprazole daily and will monitor for sxs resolution  Orders:  -     omeprazole 20 mg TbEC; Take 20 mg by mouth once daily.  Dispense: 90 each; Refill: 1    Shared psychotic disorder  -     mirtazapine (REMERON) 15 MG tablet; Take 1 tablet (15 mg total) by mouth every evening.  Dispense: 30 tablet; Refill: 5      Follow up in about 3 months (around 1/22/2025).        10/22/2024 Caden Vazquez PA-C

## 2024-10-31 DIAGNOSIS — I10 HYPERTENSION, UNSPECIFIED TYPE: ICD-10-CM

## 2024-10-31 RX ORDER — LISINOPRIL 20 MG/1
20 TABLET ORAL DAILY
Qty: 30 TABLET | Refills: 5 | Status: SHIPPED | OUTPATIENT
Start: 2024-10-31 | End: 2025-04-29

## 2024-11-11 ENCOUNTER — PATIENT MESSAGE (OUTPATIENT)
Dept: FAMILY MEDICINE | Facility: CLINIC | Age: 39
End: 2024-11-11
Payer: MEDICARE

## 2024-12-01 ENCOUNTER — PATIENT MESSAGE (OUTPATIENT)
Dept: FAMILY MEDICINE | Facility: CLINIC | Age: 39
End: 2024-12-01
Payer: MEDICARE

## 2024-12-23 ENCOUNTER — PATIENT MESSAGE (OUTPATIENT)
Dept: FAMILY MEDICINE | Facility: CLINIC | Age: 39
End: 2024-12-23
Payer: MEDICARE

## 2024-12-23 DIAGNOSIS — F24 SHARED PSYCHOTIC DISORDER: ICD-10-CM

## 2024-12-23 RX ORDER — VENLAFAXINE HYDROCHLORIDE 37.5 MG/1
37.5 CAPSULE, EXTENDED RELEASE ORAL DAILY
Qty: 30 CAPSULE | Refills: 2 | Status: SHIPPED | OUTPATIENT
Start: 2024-12-23

## 2024-12-23 RX ORDER — ARIPIPRAZOLE 10 MG/1
10 TABLET ORAL DAILY
Qty: 30 TABLET | Refills: 2 | Status: SHIPPED | OUTPATIENT
Start: 2024-12-23

## 2024-12-23 RX ORDER — ROPINIROLE 0.25 MG/1
0.25 TABLET, FILM COATED ORAL NIGHTLY
Qty: 30 TABLET | Refills: 2 | Status: SHIPPED | OUTPATIENT
Start: 2024-12-23

## 2024-12-26 ENCOUNTER — TELEPHONE (OUTPATIENT)
Dept: FAMILY MEDICINE | Facility: CLINIC | Age: 39
End: 2024-12-26
Payer: MEDICARE

## 2024-12-26 NOTE — TELEPHONE ENCOUNTER
----- Message from Sol sent at 12/26/2024  3:28 PM CST -----  Pt rescheduled 1/7/25 appt to 1/21/25 and wants to know if he could get an earlier appt.  [phone]

## 2025-01-06 RX ORDER — IBUPROFEN 800 MG/1
800 TABLET ORAL EVERY 6 HOURS PRN
COMMUNITY
Start: 2024-10-01

## 2025-01-13 ENCOUNTER — OFFICE VISIT (OUTPATIENT)
Dept: FAMILY MEDICINE | Facility: CLINIC | Age: 40
End: 2025-01-13
Payer: MEDICARE

## 2025-01-13 VITALS
HEART RATE: 82 BPM | OXYGEN SATURATION: 97 % | HEIGHT: 70 IN | DIASTOLIC BLOOD PRESSURE: 62 MMHG | BODY MASS INDEX: 26.92 KG/M2 | RESPIRATION RATE: 18 BRPM | SYSTOLIC BLOOD PRESSURE: 110 MMHG | WEIGHT: 188 LBS

## 2025-01-13 DIAGNOSIS — F24 SHARED PSYCHOTIC DISORDER: Primary | ICD-10-CM

## 2025-01-13 DIAGNOSIS — F84.0 AUTISM SPECTRUM DISORDER: ICD-10-CM

## 2025-01-13 DIAGNOSIS — R46.2 BIZARRE BEHAVIOR: ICD-10-CM

## 2025-01-13 PROCEDURE — 99214 OFFICE O/P EST MOD 30 MIN: CPT | Mod: S$GLB,,, | Performed by: PHYSICIAN ASSISTANT

## 2025-01-13 NOTE — PROGRESS NOTES
SUBJECTIVE:    Patient ID: Fidencio Torres is a 39 y.o. male.    Chief Complaint: Follow-up (No bottles// no refills needed//pt states he have no complaints today )    History of Present Illness    CHIEF COMPLAINT:  Fidencio presents today for medication management and follow-up after psychiatric facility discharge.    BLOOD PRESSURE:  He reports his blood pressure has been averaging 110/62, with concerns about it being too low.    PSYCHIATRIC MEDICATIONS:  He continues medications prescribed at the psychiatric facility including Remeron, Abilify, Effexor, and Mirtazapine. He expresses concerns about their long-term effectiveness and potential need for dosage adjustments. He notes concerns about Mirtazapine potentially increasing his appetite.    HYPERTENSION MEDICATION:  He reports confusion regarding Lisinopril dosage, having prescriptions for both 10 mg and 20 mg.    MEDICATION MANAGEMENT:  He expresses concerns about coordinating simultaneous medication refills during planned travel.      ROS:  General: -fever, -chills, -fatigue, -weight gain, -weight loss, -loss of appetite  Eyes: -vision changes, -redness, -discharge  ENT: -ear pain, -nasal congestion, -sore throat  Cardiovascular: -chest pain, -palpitations, -lower extremity edema  Respiratory: -cough, -shortness of breath  Gastrointestinal: -abdominal pain, -nausea, -vomiting, -diarrhea, -constipation, -blood in stool  Genitourinary: -dysuria, -hematuria, -frequency  Musculoskeletal: -joint pain, -muscle pain  Skin: -rash, -lesion  Neurological: -headache, -dizziness, -numbness, -tingling  Psychiatric: -anxiety, -depression, -sleep difficulty         Admission on 08/26/2024, Discharged on 08/27/2024   Component Date Value Ref Range Status    WBC 08/26/2024 4.27  3.90 - 12.70 K/uL Final    RBC 08/26/2024 5.18  4.60 - 6.20 M/uL Final    Hemoglobin 08/26/2024 14.1  14.0 - 18.0 g/dL Final    Hematocrit 08/26/2024 42.5  40.0 - 54.0 % Final    MCV 08/26/2024 82   82 - 98 fL Final    MCH 08/26/2024 27.2  27.0 - 31.0 pg Final    MCHC 08/26/2024 33.2  32.0 - 36.0 g/dL Final    RDW 08/26/2024 13.0  11.5 - 14.5 % Final    Platelets 08/26/2024 289  150 - 450 K/uL Final    MPV 08/26/2024 9.9  9.2 - 12.9 fL Final    Immature Granulocytes 08/26/2024 0.2  0.0 - 0.5 % Final    Gran # (ANC) 08/26/2024 2.6  1.8 - 7.7 K/uL Final    Immature Grans (Abs) 08/26/2024 0.01  0.00 - 0.04 K/uL Final    Lymph # 08/26/2024 1.1  1.0 - 4.8 K/uL Final    Mono # 08/26/2024 0.4  0.3 - 1.0 K/uL Final    Eos # 08/26/2024 0.2  0.0 - 0.5 K/uL Final    Baso # 08/26/2024 0.01  0.00 - 0.20 K/uL Final    nRBC 08/26/2024 0  0 /100 WBC Final    Gran % 08/26/2024 60.8  38.0 - 73.0 % Final    Lymph % 08/26/2024 26.0  18.0 - 48.0 % Final    Mono % 08/26/2024 9.1  4.0 - 15.0 % Final    Eosinophil % 08/26/2024 3.7  0.0 - 8.0 % Final    Basophil % 08/26/2024 0.2  0.0 - 1.9 % Final    Differential Method 08/26/2024 Automated   Final    Sodium 08/26/2024 138  136 - 145 mmol/L Final    Potassium 08/26/2024 3.7  3.5 - 5.1 mmol/L Final    Chloride 08/26/2024 101  95 - 110 mmol/L Final    CO2 08/26/2024 25  23 - 29 mmol/L Final    Glucose 08/26/2024 103  70 - 110 mg/dL Final    BUN 08/26/2024 27 (H)  6 - 20 mg/dL Final    Creatinine 08/26/2024 1.1  0.5 - 1.4 mg/dL Final    Calcium 08/26/2024 10.0  8.7 - 10.5 mg/dL Final    Total Protein 08/26/2024 7.5  6.0 - 8.4 g/dL Final    Albumin 08/26/2024 4.8  3.5 - 5.2 g/dL Final    Total Bilirubin 08/26/2024 0.5  0.1 - 1.0 mg/dL Final    Alkaline Phosphatase 08/26/2024 48 (L)  55 - 135 U/L Final    AST 08/26/2024 19  10 - 40 U/L Final    ALT 08/26/2024 19  10 - 44 U/L Final    eGFR 08/26/2024 >60.0  >60 mL/min/1.73 m^2 Final    Anion Gap 08/26/2024 12  8 - 16 mmol/L Final    TSH 08/26/2024 7.226 (H)  0.340 - 5.600 uIU/mL Final    Specimen UA 08/26/2024 Urine, Clean Catch   Final    Color, UA 08/26/2024 Yellow  Yellow, Straw, Seema Final    Appearance, UA 08/26/2024 Clear  Clear Final     pH, UA 08/26/2024 6.0  5.0 - 8.0 Final    Specific Gravity, UA 08/26/2024 1.030  1.005 - 1.030 Final    Protein, UA 08/26/2024 Negative  Negative Final    Glucose, UA 08/26/2024 Negative  Negative Final    Ketones, UA 08/26/2024 Trace (A)  Negative Final    Bilirubin (UA) 08/26/2024 Negative  Negative Final    Occult Blood UA 08/26/2024 Negative  Negative Final    Nitrite, UA 08/26/2024 Negative  Negative Final    Urobilinogen, UA 08/26/2024 Negative  Negative EU/dL Final    Leukocytes, UA 08/26/2024 Negative  Negative Final    Benzodiazepines 08/26/2024 Negative  Negative Final    Cocaine (Metab.) 08/26/2024 Negative  Negative Final    Opiate Scrn, Ur 08/26/2024 Negative  Negative Final    Barbiturate Screen, Ur 08/26/2024 Negative  Negative Final    Amphetamine Screen, Ur 08/26/2024 Negative  Negative Final    THC 08/26/2024 Presumptive Positive (A)  Negative Final    Phencyclidine 08/26/2024 Negative  Negative Final    Creatinine, Urine 08/26/2024 130.5  23.0 - 375.0 mg/dL Final    Toxicology Information 08/26/2024 SEE COMMENT   Final    Alcohol, Serum 08/26/2024 <10  <10 mg/dL Final    Acetaminophen (Tylenol), Serum 08/26/2024 0.3 (L)  10.0 - 20.0 ug/mL Final    SARS-CoV-2 RNA, Amplification, Qual 08/26/2024 Negative  Negative Final    Salicylate Lvl 08/26/2024 <1.5 (L)  15.0 - 30.0 mg/dL Final    QRS Duration 08/26/2024 138  ms Final    [facility] QTC Calculation 08/26/2024 443  ms Final    Ammonia 08/26/2024 26  10 - 50 umol/L Final    Free T4 08/26/2024 0.84  0.71 - 1.51 ng/dL Final    RPR 08/27/2024 Non-reactive   Final    Folate 08/27/2024 9.3  4.0 - 24.0 ng/mL Final    Vitamin B-12 08/27/2024 384  210 - 950 pg/mL Final    HIV Rapid Testing 08/27/2024 Negative  Negative Final       Past Medical History:   Diagnosis Date    Cancer 2009    Testicular     Mental health problem      Past Surgical History:   Procedure Laterality Date    testical removal Right 2009     Family History   Problem Relation Name Age of  "Onset    No Known Problems Mother      No Known Problems Father      No Known Problems Sister         Marital Status:   Alcohol History:  reports that he does not currently use alcohol.  Tobacco History:  reports that he has never smoked. He has never used smokeless tobacco.  Drug History:  reports current drug use. Drug: Marijuana.    Review of patient's allergies indicates:   Allergen Reactions    Iodine and iodide containing products Anaphylaxis       Current Outpatient Medications:     ARIPiprazole (ABILIFY) 10 MG Tab, Take 1 tablet (10 mg total) by mouth once daily., Disp: 30 tablet, Rfl: 2     mg tablet, Take 800 mg by mouth every 6 (six) hours as needed., Disp: , Rfl:     lisinopriL (PRINIVIL,ZESTRIL) 20 MG tablet, Take 1 tablet (20 mg total) by mouth once daily., Disp: 30 tablet, Rfl: 5    mirtazapine (REMERON) 15 MG tablet, Take 1 tablet (15 mg total) by mouth every evening., Disp: 30 tablet, Rfl: 5    omeprazole 20 mg TbEC, Take 20 mg by mouth once daily., Disp: 90 each, Rfl: 1    rOPINIRole (REQUIP) 0.25 MG tablet, Take 1 tablet (0.25 mg total) by mouth every evening., Disp: 30 tablet, Rfl: 2    venlafaxine (EFFEXOR-XR) 37.5 MG 24 hr capsule, Take 1 capsule (37.5 mg total) by mouth once daily., Disp: 30 capsule, Rfl: 2    Objective:      Vitals:    01/13/25 1158   BP: 110/62   Pulse: 82   Resp: 18   SpO2: 97%   Weight: 85.3 kg (188 lb)   Height: 5' 10" (1.778 m)     Physical Exam    Vitals: Blood pressure: 110/62.  General: No acute distress. Well-developed. Well-nourished.  Eyes: EOMI. Sclerae anicteric.  HENT: Normocephalic. Atraumatic. Nares patent. Moist oral mucosa.  Ears: Bilateral TMs clear. Bilateral EACs clear.  Cardiovascular: Regular rate. Regular rhythm. No murmurs. No rubs. No gallops. Normal S1, S2.  Respiratory: Normal respiratory effort. Clear to auscultation bilaterally. No rales. No rhonchi. No wheezing.  Abdomen: Soft. Non-tender. Non-distended. Normoactive bowel " sounds.  Musculoskeletal: No  obvious deformity.  Extremities: No lower extremity edema.  Neurological: Alert & oriented x3. No slurred speech. Normal gait.  Psychiatric: Normal mood. Normal affect. Good insight. Good judgment.  Skin: Warm. Dry. No rash.         Assessment:       Assessment & Plan    - Maintained current medication regimen due to patient's stable condition and improved health status since October  - Deferred changes to psychiatric medications until patient can be evaluated by Dr. Brittany Irby, PhD prescribing psychologist  - Monitored blood pressure, which has been well-controlled on current regimen    SHARED PSYCHOTIC DISORDER:  - Fidencio is stable since the last visit, following discharge from an inpatient psychiatric facility, with significant improvement observed since October.  - Instructed to maintain consistent medication regimen to avoid relapse of psychiatric symptoms.  - Current medications include Remeron 15mg at night, Lisinopril 20mg, Abilify, Effexor, and Mirtazapine.  - Continued Abilify and refilled all medications to ensure continuity of treatment.  - Emphasized the importance of medication adherence, especially during travel, to prevent relapse.    DEPRESSION:  - Referred the patient to Dr. Brittany Irby, PhD prescribing psychologist, for management of psychiatric medications.  - Advised to maintain current medication regimen until the patient can be seen by Dr. Irby.  - Discussed the need for ongoing medication management and potential future adjustments.    HYPERTENSION:  - Fidencio's blood pressure has been well-controlled, averaging around 110/62 at home, with the highest reading being 127.  - Current blood pressure reading is 110/62, considered excellent.  - Advised the patient to spot check blood pressure and report if it's running high.  - Will continue current blood pressure medication without changes.    GASTROESOPHAGEAL REFLUX DISEASE (GERD):  - Discussed  management of reflux medication.    RESTLESS LEG SYNDROME:  - Continued Ropinirole 0.25 mg nightly for restless leg syndrome.    FOLLOW UP:  - Instructed the patient to follow up in 6 months or upon return to the area.  - Advised to contact the office if Dr. Irby's office does not reach out within a few business days regarding the referral.       Plan:       Shared psychotic disorder  -     Ambulatory referral/consult to Psychology; Future; Expected date: 01/13/2025    Autism spectrum disorder  -     Ambulatory referral/consult to Psychology; Future; Expected date: 01/13/2025    Bizarre behavior  -     Ambulatory referral/consult to Psychology; Future; Expected date: 01/13/2025      Follow up in about 6 months (around 7/13/2025).    This note was generated with the assistance of ambient listening technology. Verbal consent was obtained by the patient and accompanying visitor(s) for the recording of patient appointment to facilitate this note. I attest to having reviewed and edited the generated note for accuracy, though some syntax or spelling errors may persist. Please contact the author of this note for any clarification.          1/13/2025 Caden Vazquez PA-C

## 2025-01-15 NOTE — ED NOTES
Md at bedside    New pharmacy is [facility]  Bupropion xl 150 mg 24 tablet #90  Take 1 tablet by mouth daily    Lisinopril 20 mg #90  Take 1 tablet by mouth daily    Rosuvastatin 40 mg #90  Take 1 tablet by mouth annabella

## 2025-01-24 ENCOUNTER — PATIENT MESSAGE (OUTPATIENT)
Dept: FAMILY MEDICINE | Facility: CLINIC | Age: 40
End: 2025-01-24
Payer: MEDICARE

## 2025-03-16 ENCOUNTER — PATIENT MESSAGE (OUTPATIENT)
Dept: FAMILY MEDICINE | Facility: CLINIC | Age: 40
End: 2025-03-16
Payer: MEDICARE

## 2025-03-16 DIAGNOSIS — G47.00 INSOMNIA, UNSPECIFIED TYPE: ICD-10-CM

## 2025-03-16 DIAGNOSIS — F24 SHARED PSYCHOTIC DISORDER: ICD-10-CM

## 2025-03-16 DIAGNOSIS — I10 HYPERTENSION, UNSPECIFIED TYPE: ICD-10-CM

## 2025-03-17 RX ORDER — ARIPIPRAZOLE 5 MG/1
5 TABLET ORAL DAILY
Qty: 30 TABLET | Refills: 1 | Status: SHIPPED | OUTPATIENT
Start: 2025-03-17 | End: 2025-03-18 | Stop reason: DRUGHIGH

## 2025-03-17 RX ORDER — ROPINIROLE 0.25 MG/1
0.25 TABLET, FILM COATED ORAL NIGHTLY
Qty: 30 TABLET | Refills: 1 | Status: SHIPPED | OUTPATIENT
Start: 2025-03-17 | End: 2025-03-18 | Stop reason: SDUPTHER

## 2025-03-17 RX ORDER — LISINOPRIL 20 MG/1
20 TABLET ORAL DAILY
Qty: 30 TABLET | Refills: 1 | Status: SHIPPED | OUTPATIENT
Start: 2025-03-17 | End: 2025-03-18 | Stop reason: SDUPTHER

## 2025-03-17 RX ORDER — VENLAFAXINE HYDROCHLORIDE 37.5 MG/1
37.5 CAPSULE, EXTENDED RELEASE ORAL DAILY
Qty: 30 CAPSULE | Refills: 1 | Status: SHIPPED | OUTPATIENT
Start: 2025-03-17 | End: 2025-03-18 | Stop reason: SDUPTHER

## 2025-03-17 RX ORDER — MIRTAZAPINE 15 MG/1
15 TABLET, FILM COATED ORAL NIGHTLY
Qty: 30 TABLET | Refills: 1 | Status: SHIPPED | OUTPATIENT
Start: 2025-03-17 | End: 2025-03-18 | Stop reason: SDUPTHER

## 2025-03-18 RX ORDER — MIRTAZAPINE 15 MG/1
15 TABLET, FILM COATED ORAL NIGHTLY
Qty: 30 TABLET | Refills: 1 | Status: SHIPPED | OUTPATIENT
Start: 2025-03-18 | End: 2025-09-14

## 2025-03-18 RX ORDER — VENLAFAXINE HYDROCHLORIDE 37.5 MG/1
37.5 CAPSULE, EXTENDED RELEASE ORAL DAILY
Qty: 30 CAPSULE | Refills: 1 | Status: SHIPPED | OUTPATIENT
Start: 2025-03-18

## 2025-03-18 RX ORDER — ROPINIROLE 0.25 MG/1
0.25 TABLET, FILM COATED ORAL NIGHTLY
Qty: 30 TABLET | Refills: 1 | Status: SHIPPED | OUTPATIENT
Start: 2025-03-18

## 2025-03-18 RX ORDER — ARIPIPRAZOLE 5 MG/1
5 TABLET ORAL DAILY
Qty: 30 TABLET | Refills: 1 | Status: SHIPPED | OUTPATIENT
Start: 2025-03-18 | End: 2026-03-18

## 2025-03-18 RX ORDER — LISINOPRIL 20 MG/1
20 TABLET ORAL DAILY
Qty: 30 TABLET | Refills: 1 | Status: SHIPPED | OUTPATIENT
Start: 2025-03-18 | End: 2025-09-14

## 2025-04-19 ENCOUNTER — PATIENT MESSAGE (OUTPATIENT)
Dept: FAMILY MEDICINE | Facility: CLINIC | Age: 40
End: 2025-04-19
Payer: MEDICARE

## 2025-04-19 DIAGNOSIS — G47.00 INSOMNIA, UNSPECIFIED TYPE: ICD-10-CM

## 2025-04-19 DIAGNOSIS — F24 SHARED PSYCHOTIC DISORDER: ICD-10-CM

## 2025-04-19 DIAGNOSIS — I10 HYPERTENSION, UNSPECIFIED TYPE: ICD-10-CM

## 2025-04-21 RX ORDER — ARIPIPRAZOLE 5 MG/1
5 TABLET ORAL DAILY
Qty: 30 TABLET | Refills: 0 | Status: SHIPPED | OUTPATIENT
Start: 2025-04-21 | End: 2026-04-21

## 2025-04-21 RX ORDER — MIRTAZAPINE 15 MG/1
15 TABLET, FILM COATED ORAL NIGHTLY
Qty: 30 TABLET | Refills: 0 | Status: SHIPPED | OUTPATIENT
Start: 2025-04-21 | End: 2025-10-18

## 2025-04-21 RX ORDER — LISINOPRIL 20 MG/1
20 TABLET ORAL DAILY
Qty: 30 TABLET | Refills: 0 | Status: SHIPPED | OUTPATIENT
Start: 2025-04-21 | End: 2025-10-18

## 2025-04-21 RX ORDER — VENLAFAXINE HYDROCHLORIDE 37.5 MG/1
37.5 CAPSULE, EXTENDED RELEASE ORAL DAILY
Qty: 30 CAPSULE | Refills: 0 | Status: SHIPPED | OUTPATIENT
Start: 2025-04-21

## 2025-04-21 RX ORDER — ROPINIROLE 0.25 MG/1
0.25 TABLET, FILM COATED ORAL NIGHTLY
Qty: 30 TABLET | Refills: 0 | Status: SHIPPED | OUTPATIENT
Start: 2025-04-21

## 2025-05-27 DIAGNOSIS — G47.00 INSOMNIA, UNSPECIFIED TYPE: ICD-10-CM

## 2025-05-27 DIAGNOSIS — F24 SHARED PSYCHOTIC DISORDER: ICD-10-CM

## 2025-05-27 DIAGNOSIS — I10 HYPERTENSION, UNSPECIFIED TYPE: ICD-10-CM

## 2025-05-28 RX ORDER — ARIPIPRAZOLE 5 MG/1
5 TABLET ORAL DAILY
Qty: 30 TABLET | Refills: 0 | Status: SHIPPED | OUTPATIENT
Start: 2025-05-28 | End: 2026-05-28

## 2025-05-28 RX ORDER — VENLAFAXINE HYDROCHLORIDE 37.5 MG/1
37.5 CAPSULE, EXTENDED RELEASE ORAL DAILY
Qty: 30 CAPSULE | Refills: 0 | Status: SHIPPED | OUTPATIENT
Start: 2025-05-28

## 2025-05-28 RX ORDER — MIRTAZAPINE 15 MG/1
15 TABLET, FILM COATED ORAL NIGHTLY
Qty: 30 TABLET | Refills: 0 | Status: SHIPPED | OUTPATIENT
Start: 2025-05-28 | End: 2025-11-24

## 2025-05-28 RX ORDER — LISINOPRIL 20 MG/1
20 TABLET ORAL DAILY
Qty: 30 TABLET | Refills: 0 | Status: SHIPPED | OUTPATIENT
Start: 2025-05-28 | End: 2025-11-24

## 2025-05-28 RX ORDER — ROPINIROLE 0.25 MG/1
0.25 TABLET, FILM COATED ORAL NIGHTLY
Qty: 30 TABLET | Refills: 0 | Status: SHIPPED | OUTPATIENT
Start: 2025-05-28

## 2025-05-28 NOTE — TELEPHONE ENCOUNTER
[facility] patient that is in [place] visiting mother, extended his stay and will be going to [place] at 2:00, can you please sign so they can grab these before they go?

## 2025-06-21 ENCOUNTER — PATIENT MESSAGE (OUTPATIENT)
Dept: FAMILY MEDICINE | Facility: CLINIC | Age: 40
End: 2025-06-21
Payer: MEDICARE

## 2025-06-21 DIAGNOSIS — G47.00 INSOMNIA, UNSPECIFIED TYPE: ICD-10-CM

## 2025-06-21 DIAGNOSIS — I10 HYPERTENSION, UNSPECIFIED TYPE: ICD-10-CM

## 2025-06-21 DIAGNOSIS — F24 SHARED PSYCHOTIC DISORDER: ICD-10-CM

## 2025-06-23 RX ORDER — MIRTAZAPINE 15 MG/1
15 TABLET, FILM COATED ORAL NIGHTLY
Qty: 90 TABLET | Refills: 0 | Status: SHIPPED | OUTPATIENT
Start: 2025-06-23 | End: 2025-12-20

## 2025-06-23 RX ORDER — ARIPIPRAZOLE 5 MG/1
5 TABLET ORAL DAILY
Qty: 90 TABLET | Refills: 0 | Status: SHIPPED | OUTPATIENT
Start: 2025-06-23 | End: 2026-06-23

## 2025-06-23 RX ORDER — ROPINIROLE 0.25 MG/1
0.25 TABLET, FILM COATED ORAL NIGHTLY
Qty: 90 TABLET | Refills: 0 | Status: SHIPPED | OUTPATIENT
Start: 2025-06-23

## 2025-06-23 RX ORDER — VENLAFAXINE HYDROCHLORIDE 37.5 MG/1
37.5 CAPSULE, EXTENDED RELEASE ORAL DAILY
Qty: 90 CAPSULE | Refills: 0 | Status: SHIPPED | OUTPATIENT
Start: 2025-06-23

## 2025-06-23 RX ORDER — LISINOPRIL 20 MG/1
20 TABLET ORAL DAILY
Qty: 90 TABLET | Refills: 0 | Status: SHIPPED | OUTPATIENT
Start: 2025-06-23 | End: 2025-12-20

## 2025-07-22 DIAGNOSIS — G47.00 INSOMNIA, UNSPECIFIED TYPE: ICD-10-CM

## 2025-07-22 DIAGNOSIS — F24 SHARED PSYCHOTIC DISORDER: ICD-10-CM

## 2025-07-22 DIAGNOSIS — I10 HYPERTENSION, UNSPECIFIED TYPE: ICD-10-CM

## 2025-07-22 RX ORDER — LISINOPRIL 20 MG/1
20 TABLET ORAL DAILY
Qty: 90 TABLET | Refills: 0 | Status: SHIPPED | OUTPATIENT
Start: 2025-07-22 | End: 2026-01-18

## 2025-07-22 RX ORDER — ARIPIPRAZOLE 5 MG/1
5 TABLET ORAL DAILY
Qty: 90 TABLET | Refills: 0 | Status: SHIPPED | OUTPATIENT
Start: 2025-07-22 | End: 2026-07-22

## 2025-07-22 RX ORDER — MIRTAZAPINE 15 MG/1
15 TABLET, FILM COATED ORAL NIGHTLY
Qty: 90 TABLET | Refills: 0 | Status: SHIPPED | OUTPATIENT
Start: 2025-07-22 | End: 2026-01-18

## 2025-07-22 RX ORDER — VENLAFAXINE HYDROCHLORIDE 37.5 MG/1
37.5 CAPSULE, EXTENDED RELEASE ORAL DAILY
Qty: 90 CAPSULE | Refills: 0 | Status: SHIPPED | OUTPATIENT
Start: 2025-07-22

## 2025-07-22 RX ORDER — ROPINIROLE 0.25 MG/1
0.25 TABLET, FILM COATED ORAL NIGHTLY
Qty: 90 TABLET | Refills: 0 | Status: SHIPPED | OUTPATIENT
Start: 2025-07-22